# Patient Record
Sex: FEMALE | Race: WHITE | NOT HISPANIC OR LATINO | Employment: UNEMPLOYED | URBAN - METROPOLITAN AREA
[De-identification: names, ages, dates, MRNs, and addresses within clinical notes are randomized per-mention and may not be internally consistent; named-entity substitution may affect disease eponyms.]

---

## 2017-05-17 DIAGNOSIS — Z78.9 USES CONTRACEPTION: ICD-10-CM

## 2017-05-17 RX ORDER — ETONOGESTREL/ETHINYL ESTRADIOL .12-.015MG
RING, VAGINAL VAGINAL
Qty: 1 EACH | Refills: 0 | Status: SHIPPED | OUTPATIENT
Start: 2017-05-17 | End: 2017-09-07

## 2017-05-17 NOTE — TELEPHONE ENCOUNTER
Nuvaring 0.12-0.015 MG/24 HR vaginal ring      Last Written Prescription Date: 2/20/17  Last Fill Quantity: 3,   # refills: 0  Last Office Visit with [facility] primary care provider:  12/7/1663-Rfeiqm-Wx, 5/25/16-Annual  Future Office visit: None  Last appt on 12/7/16 POC: She will have 2 cycles with Nuva Ring and call for IUD placement, Yokasta. Method, risks, benefits discussed. Pt will premedicate with ibuprofen 1 hour before insertion.     Routing refill request to SOO Velazco to review and advise. No appt currently scheduled for either an annual or IUD placement.

## 2017-07-03 ENCOUNTER — OFFICE VISIT (OUTPATIENT)
Dept: OBGYN | Facility: CLINIC | Age: 22
End: 2017-07-03
Payer: COMMERCIAL

## 2017-07-03 VITALS — WEIGHT: 130 LBS | SYSTOLIC BLOOD PRESSURE: 110 MMHG | BODY MASS INDEX: 22.31 KG/M2 | DIASTOLIC BLOOD PRESSURE: 66 MMHG

## 2017-07-03 DIAGNOSIS — Z30.430 ENCOUNTER FOR IUD INSERTION: Primary | ICD-10-CM

## 2017-07-03 LAB — BETA HCG QUAL IFA URINE: NEGATIVE

## 2017-07-03 PROCEDURE — 84703 CHORIONIC GONADOTROPIN ASSAY: CPT | Performed by: OBSTETRICS & GYNECOLOGY

## 2017-07-03 PROCEDURE — 58300 INSERT INTRAUTERINE DEVICE: CPT | Performed by: OBSTETRICS & GYNECOLOGY

## 2017-07-03 NOTE — MR AVS SNAPSHOT
"              After Visit Summary   7/3/2017    Tim Roberto    MRN: [id number]           Patient Information     Date Of Birth          1995        Visit Information        Provider Department      7/3/2017 11:45 AM Gabriel Beck MD [facility]        Today's Diagnoses     Encounter for IUD insertion    -  1       Follow-ups after your visit        Who to contact     If you have questions or need follow up information about today's clinic visit or your schedule please contact [facility] directly at [phone].  Normal or non-critical lab and imaging results will be communicated to you by Moogihart, letter or phone within 4 business days after the clinic has received the results. If you do not hear from us within 7 days, please contact the clinic through Acceptdt or phone. If you have a critical or abnormal lab result, we will notify you by phone as soon as possible.  Submit refill requests through GenNext Media or call your pharmacy and they will forward the refill request to us. Please allow 3 business days for your refill to be completed.          Additional Information About Your Visit        MyChart Information     Gen[employer] Media lets you send messages to your doctor, view your test results, renew your prescriptions, schedule appointments and more. To sign up, go to [email or web address] . Click on \"Log in\" on the left side of the screen, which will take you to the Welcome page. Then click on \"Sign up Now\" on the right side of the page.     You will be asked to enter the access code listed below, as well as some personal information. Please follow the directions to create your username and password.     Your access code is: [email or web address]  Expires: 10/1/2017 11:53 AM     Your access code will  in 90 days. If you need help or a new code, please call your [place] clinic or [phone].        Care EveryWhere ID     This is your Care EveryWhere ID. This could be used " by other organizations to access your [place] medical records  [id number]        Your Vitals Were     Last Period Breastfeeding? BMI (Body Mass Index)             06/30/2017 (Approximate) No 22.31 kg/m2          Blood Pressure from Last 3 Encounters:   07/03/17 110/66   12/07/16 102/76   11/09/16 104/60    Weight from Last 3 Encounters:   07/03/17 130 lb (59 kg)   12/07/16 127 lb (57.6 kg)   11/09/16 128 lb (58.1 kg)              We Performed the Following     Beta HCG qual IFA urine     IUD INSERTION     LEVONORGESTREL-RELEASE INTRAUTERINE CONTRACEPTIVE (AUSTIN), 13.5 MG          Today's Medication Changes          These changes are accurate as of: 7/3/17 12:16 PM.  If you have any questions, ask your nurse or doctor.               Start taking these medicines.        Dose/Directions    Levonorgestrel 13.5 MG Iud   Commonly known as:  AUSTIN   Used for:  Encounter for IUD insertion   Started by:  Gabriel Beck MD        Dose:  1 Intra Uterine Device   1 each (13.5 mg) by Intrauterine route once for 1 dose   Quantity:  1 each   Refills:  0            Where to get your medicines      Some of these will need a paper prescription and others can be bought over the counter.  Ask your nurse if you have questions.     You don't need a prescription for these medications     Levonorgestrel 13.5 MG Iud                Primary Care Provider    None Specified       No primary provider on file.        Equal Access to Services     MIGUEL [facility]ON[facility] : Rahul Larson, allie real, teo shepherd . So [facility] [phone].    ATENCIÓN: Si habla español, tiene a vazquez disposición servicios gratuitos de asistencia lingüística. Llame al [phone].    We comply with applicable federal civil rights laws and [place] laws. We do not discriminate on the basis of race, color, national origin, age, disability sex, sexual orientation or gender identity.            Thank you!      Thank you for choosing [facility] [place]  for your care. Our goal is always to provide you with excellent care. Hearing back from our patients is one way we can continue to improve our services. Please take a few minutes to complete the written survey that you may receive in the mail after your visit with us. Thank you!             Your Updated Medication List - Protect others around you: Learn how to safely use, store and throw away your medicines at www.disposemymeds.org.          This list is accurate as of: 7/3/17 12:16 PM.  Always use your most recent med list.                   Brand Name Dispense Instructions for use Diagnosis    Levonorgestrel 13.5 MG Iud    AUSTIN    1 each    1 each (13.5 mg) by Intrauterine route once for 1 dose    Encounter for IUD insertion       NUVARING 0.12-0.015 MG/24HR vaginal ring   Generic drug:  etonogestrel-ethinyl estradiol     1 each    PLACE 1 RING VAGINALLY EVERY 28 DAYS.    Uses contraception

## 2017-07-03 NOTE — PROGRESS NOTES
INDICATIONS:                                                      Is a pregnancy test required: Yes.  Was it positive or negative?  Negative  Was a consent obtained?  Yes    AUSTIN IUD  NDC: 03871-893-48  LOT: [id number]      Tim Roberto is a 21 year old female,   who presents for insertion of an IUD. The risks, benefits and alternatives of IUD insertion were discussed in detail previously. She also has reviewed the product brochure.  She has elected to go ahead with the insertion  today and her questions were answered. Consent was signed. Her LMP began on  and was normal in duration and amount of flow. A pregnancy test was performed today:  Yes      PROCEDURE:                                                      The pelvic exam revealed normal external genitalia. On bimanual exam the uterus was Anteverted and Midposition and normal in size with no tenderness present. A speculum was inserted into the vagina and the cervix was visualized. The cervix was prepped with Betadine . The anterior lip of the cervix was grasped with a single toothed tenaculum. The uterus sounded to 6 cm. A Austin IUD was then inserted without difficulty. The string was cut to 3 cm.  The patient experienced a moderate amount of cramping.    POST PROCEDURE:                                                      She  tolerated the procedure well. There were no complications. Patient was discharged in stable condition.    Return to clinic in 5 weeks for IUD check.  Call if severe cramping, fever, abnormal bleeding, abnormal discharge or pelvic pain develop.  Patient was counseled about the chance of irregular bleeding.    Gabriel Beck MD

## 2017-09-07 ENCOUNTER — HOSPITAL ENCOUNTER (OUTPATIENT)
Dept: MAMMOGRAPHY | Facility: CLINIC | Age: 22
Discharge: HOME | End: 2017-09-07
Attending: NURSE PRACTITIONER | Admitting: NURSE PRACTITIONER
Payer: COMMERCIAL

## 2017-09-07 ENCOUNTER — OFFICE VISIT (OUTPATIENT)
Dept: OBGYN | Facility: CLINIC | Age: 22
End: 2017-09-07
Payer: COMMERCIAL

## 2017-09-07 VITALS
BODY MASS INDEX: 22.36 KG/M2 | DIASTOLIC BLOOD PRESSURE: 74 MMHG | WEIGHT: 131 LBS | SYSTOLIC BLOOD PRESSURE: 118 MMHG | HEIGHT: 64 IN

## 2017-09-07 DIAGNOSIS — N63.20 LEFT BREAST LUMP: Primary | ICD-10-CM

## 2017-09-07 DIAGNOSIS — N63.20 LEFT BREAST LUMP: ICD-10-CM

## 2017-09-07 PROCEDURE — 76642 ULTRASOUND BREAST LIMITED: CPT | Mod: LT

## 2017-09-07 PROCEDURE — 99213 OFFICE O/P EST LOW 20 MIN: CPT | Performed by: NURSE PRACTITIONER

## 2017-09-07 NOTE — MR AVS SNAPSHOT
"              After Visit Summary   9/7/2017    Tim Roberto    MRN: [id number]           Patient Information     Date Of Birth          1995        Visit Information        Provider Department      9/7/2017 1:30 PM Mora Garrett APRN CNP [facility]        Today's Diagnoses     Left breast lump    -  1       Follow-ups after your visit        Future tests that were ordered for you today     Open Future Orders        Priority Expected Expires Ordered    US Breast Left Complete 4 Quadrants Routine  9/7/2018 9/7/2017            Who to contact     If you have questions or need follow up information about today's clinic visit or your schedule please contact [facility] directly at [phone].  Normal or non-critical lab and imaging results will be communicated to you by Capricor Therapeuticshart, letter or phone within 4 business days after the clinic has received the results. If you do not hear from us within 7 days, please contact the clinic through Capricor Therapeuticshart or phone. If you have a critical or abnormal lab result, we will notify you by phone as soon as possible.  Submit refill requests through Educerus or call your pharmacy and they will forward the refill request to us. Please allow 3 business days for your refill to be completed.          Additional Information About Your Visit        MyChart Information     Educerus lets you send messages to your doctor, view your test results, renew your prescriptions, schedule appointments and more. To sign up, go to [email or web address] . Click on \"Log in\" on the left side of the screen, which will take you to the Welcome page. Then click on \"Sign up Now\" on the right side of the page.     You will be asked to enter the access code listed below, as well as some personal information. Please follow the directions to create your username and password.     Your access code is: [email or web address]  Expires: 10/1/2017 11:53 AM     Your access code will " " in 90 days. If you need help or a new code, please call your [place] clinic or [phone].        Care EveryWhere ID     This is your Care EveryWhere ID. This could be used by other organizations to access your [place] medical records  [id number]        Your Vitals Were     Height BMI (Body Mass Index)                5' 4\" (1.626 m) 22.49 kg/m2           Blood Pressure from Last 3 Encounters:   17 118/74   17 110/66   16 102/76    Weight from Last 3 Encounters:   17 131 lb (59.4 kg)   17 130 lb (59 kg)   16 127 lb (57.6 kg)               Primary Care Provider    None Specified       No primary provider on file.        Equal Access to Services     EMMA QUIGLEY : Rahul Larson, wagisel luqadaha, qaybta kaalmajarod hirsch, teo parks . So [facility] [phone].    ATENCIÓN: Si habla español, tiene a vazquez disposición servicios gratuitos de asistencia lingüística. Llame al [phone].    We comply with applicable federal civil rights laws and [place] laws. We do not discriminate on the basis of race, color, national origin, age, disability sex, sexual orientation or gender identity.            Thank you!     Thank you for choosing [facility]  for your care. Our goal is always to provide you with excellent care. Hearing back from our patients is one way we can continue to improve our services. Please take a few minutes to complete the written survey that you may receive in the mail after your visit with us. Thank you!             Your Updated Medication List - Protect others around you: Learn how to safely use, store and throw away your medicines at www.disposemymeds.org.          This list is accurate as of: 17  2:29 PM.  Always use your most recent med list.                   Brand Name Dispense Instructions for use Diagnosis    Levonorgestrel 13.5 MG Iud    AUSTIN    1 each    1 each (13.5 mg) by " Intrauterine route once for 1 dose    Encounter for IUD insertion

## 2017-09-07 NOTE — PROGRESS NOTES
SUBJECTIVE:                                                   Tim Roberto is a 21 year old female who presents to clinic today for the following health issue(s):  Patient presents with:  Breast Mass: Noticed lump 3 wks ago, got more tender. States has gotten better but still there      HPI:  Pt here today with c/o of a left breast lump right behind her nipple. She also notices that her nipple does not get erect as the right one does.     She has had in the last 6 months, a pregnancy, elective AB and an IUD placed.     No LMP recorded. Patient is not currently having periods (Reason: IUD)..   Patient is sexually active, .  Using IUD for contraception.    reports that she has been smoking Other.  She has never used smokeless tobacco.  Tobacco Cessation Action Plan: Information offered: Patient not interested at this time  STD testing offered?  Declined    Health maintenance updated:  yes    Today's PHQ-2 Score:   PHQ-2 (  [employer]) 2016   Q1: Little interest or pleasure in doing things 1   Q2: Feeling down, depressed or hopeless 1   PHQ-2 Score 2     Today's PHQ-9 Score:   PHQ-9 SCORE 2016   Total Score 7     Today's TOBIAS-7 Score:   TOBIAS-7 SCORE 2016   Total Score 6       Problem list and histories reviewed & adjusted, as indicated.  Additional history: as documented.    Patient Active Problem List   Diagnosis     Suicidal ideation     Episodic mood disorder (H)     Cannabis abuse     Counseling for parent-child problem     Past Surgical History:   Procedure Laterality Date     PROBE LACRIMAL DUCT      as a baby      Social History   Substance Use Topics     Smoking status: Current Every Day Smoker     Types: Other     Smokeless tobacco: Never Used      Comment: 3 cigs/day      Alcohol use No      Problem (# of Occurrences) Relation (Name,Age of Onset)    Hyperlipidemia (1) Father       Negative family history of: DIABETES, Breast Cancer            Current Outpatient Prescriptions  "  Medication Sig     Levonorgestrel (AUSTIN) 13.5 MG IUD 1 each (13.5 mg) by Intrauterine route once for 1 dose     No current facility-administered medications for this visit.      No Known Allergies    ROS:  12 point review of systems negative other than symptoms noted below.  Breast: Lumps and Tenderness    OBJECTIVE:     /74  Ht 5' 4\" (1.626 m)  Wt 131 lb (59.4 kg)  BMI 22.49 kg/m2  Body mass index is 22.49 kg/(m^2).    Exam:  Constitutional:  Appearance: Well nourished, well developed alert, in no acute distress  Breasts:  Inspection of Breasts:  No lymphadenopathy present; Palpation of Breasts and Axillae:  SMALL PEANUT SIZE LUMP RIGHT BEHIND LEFT NIPPLE, MOBILE, no breast tenderness Axillary Lymph Nodes:  No lymphadenopathy present  Neurologic/Psychiatric:  Mental Status:  Oriented X3      In-Clinic Test Results:  No results found for this or any previous visit (from the past 24 hour(s)).    ASSESSMENT/PLAN:                                                        ICD-10-CM    1. Left breast lump N63 US Breast Left Complete 4 Quadrants       There are no Patient Instructions on file for this visit.    Likely a hormonal cyst given her gynecological history in the last 6-9 months. Will r/o with ultrasound.    TRAN Reyes CNP  [facility] [place]  "

## 2018-06-20 ENCOUNTER — OFFICE VISIT (OUTPATIENT)
Dept: OBGYN | Facility: CLINIC | Age: 23
End: 2018-06-20
Payer: COMMERCIAL

## 2018-06-20 VITALS
SYSTOLIC BLOOD PRESSURE: 94 MMHG | WEIGHT: 139.25 LBS | HEIGHT: 64 IN | HEART RATE: 80 BPM | BODY MASS INDEX: 23.77 KG/M2 | DIASTOLIC BLOOD PRESSURE: 64 MMHG

## 2018-06-20 DIAGNOSIS — N94.6 DYSMENORRHEA: Primary | ICD-10-CM

## 2018-06-20 DIAGNOSIS — Z11.8 SPECIAL SCREENING EXAMINATION FOR CHLAMYDIAL DISEASE: ICD-10-CM

## 2018-06-20 DIAGNOSIS — Z11.3 SCREEN FOR SEXUALLY TRANSMITTED DISEASES: ICD-10-CM

## 2018-06-20 DIAGNOSIS — Z30.432 ENCOUNTER FOR IUD REMOVAL: ICD-10-CM

## 2018-06-20 PROCEDURE — 58301 REMOVE INTRAUTERINE DEVICE: CPT | Performed by: NURSE PRACTITIONER

## 2018-06-20 PROCEDURE — 99213 OFFICE O/P EST LOW 20 MIN: CPT | Mod: 25 | Performed by: NURSE PRACTITIONER

## 2018-06-20 PROCEDURE — 87491 CHLMYD TRACH DNA AMP PROBE: CPT | Performed by: NURSE PRACTITIONER

## 2018-06-20 PROCEDURE — 87591 N.GONORRHOEAE DNA AMP PROB: CPT | Performed by: NURSE PRACTITIONER

## 2018-06-20 RX ORDER — LANOLIN ALCOHOL/MO/W.PET/CERES
3 CREAM (GRAM) TOPICAL
COMMUNITY

## 2018-06-20 RX ORDER — NORELGESTROMIN AND ETHINYL ESTRADIOL 35; 150 UG/MG; UG/MG
PATCH TRANSDERMAL
Qty: 9 PATCH | Refills: 3 | Status: SHIPPED | OUTPATIENT
Start: 2018-06-20 | End: 2019-11-14

## 2018-06-20 NOTE — MR AVS SNAPSHOT
"              After Visit Summary   2018    Tim Roberto    MRN: [id number]           Patient Information     Date Of Birth          1995        Visit Information        Provider Department      2018 2:00 PM Mora Garrett APRN CNP [facility]        Today's Diagnoses     Dysmenorrhea    -  1    Encounter for IUD removal        Screen for sexually transmitted diseases        Special screening examination for chlamydial disease           Follow-ups after your visit        Who to contact     If you have questions or need follow up information about today's clinic visit or your schedule please contact [facility] directly at [phone].  Normal or non-critical lab and imaging results will be communicated to you by MyChart, letter or phone within 4 business days after the clinic has received the results. If you do not hear from us within 7 days, please contact the clinic through [employer] or phone. If you have a critical or abnormal lab result, we will notify you by phone as soon as possible.  Submit refill requests through MailMag or call your pharmacy and they will forward the refill request to us. Please allow 3 business days for your refill to be completed.          Additional Information About Your Visit        MyChart Information     MailMag lets you send messages to your doctor, view your test results, renew your prescriptions, schedule appointments and more. To sign up, go to [email or web address] . Click on \"Log in\" on the left side of the screen, which will take you to the Welcome page. Then click on \"Sign up Now\" on the right side of the page.     You will be asked to enter the access code listed below, as well as some personal information. Please follow the directions to create your username and password.     Your access code is: [email or web address]  Expires: 2018  1:59 PM     Your access code will  in 90 days. If you need help or a new " "code, please call your [place] clinic or [phone].        Care EveryWhere ID     This is your Care EveryWhere ID. This could be used by other organizations to access your [place] medical records  [id number]        Your Vitals Were     Pulse Height Last Period Breastfeeding? BMI (Body Mass Index)       80 5' 4.25\" (1.632 m) 06/07/2018 (Approximate) No 23.72 kg/m2        Blood Pressure from Last 3 Encounters:   06/20/18 94/64   09/07/17 118/74   07/03/17 110/66    Weight from Last 3 Encounters:   06/20/18 139 lb 4 oz (63.2 kg)   09/07/17 131 lb (59.4 kg)   07/03/17 130 lb (59 kg)              We Performed the Following     CHLAMYDIA TRACHOMATIS PCR     NEISSERIA GONORRHOEA PCR          Today's Medication Changes          These changes are accurate as of 6/20/18  2:47 PM.  If you have any questions, ask your nurse or doctor.               Start taking these medicines.        Dose/Directions    norelgestromin-ethinyl estradiol 150-35 MCG/24HR patch   Commonly known as:  ORTHO EVRA   Used for:  Dysmenorrhea   Started by:  Mora Garrett APRN CNP        Remove old patch and apply new patch onto the skin once a week for 3 weeks (21 days). Do not wear patch week 4 (days 22-28), then repeat.   Quantity:  9 patch   Refills:  3         Stop taking these medicines if you haven't already. Please contact your care team if you have questions.     Levonorgestrel 13.5 MG Iud   Commonly known as:  AUSTIN   Stopped by:  Mora Garrett APRN CNP                Where to get your medicines      These medications were sent to [id number] - [place]EVENS - [address]  [address] [facility] [place] [address] [place] [address] [facility] [place] [address]     Phone:  [phone]     norelgestromin-ethinyl estradiol 150-35 MCG/24HR patch                Primary Care Provider Office Phone # Fax #    [facility] [phone]       [place] [address]  [address] [facility] [address]  [facility] [address]        Equal Access to Services     EMMA QUIGLEY : Hadii aad ku hadumupaola Sojame, watraceyda lumarianosamanthaha, qakalpeshta kanewjarod noelmylenejarod, teo javierhemantmaxim briceno. So [facility] [phone].    ATENCIÓN: Si habla español, tiene a vazquez disposición servicios gratuitos de asistencia lingüística. Llame al [phone].    We comply with applicable federal civil rights laws and [place] laws. We do not discriminate on the basis of race, color, national origin, age, disability, sex, sexual orientation, or gender identity.            Thank you!     Thank you for choosing [facility]  for your care. Our goal is always to provide you with excellent care. Hearing back from our patients is one way we can continue to improve our services. Please take a few minutes to complete the written survey that you may receive in the mail after your visit with us. Thank you!             Your Updated Medication List - Protect others around you: Learn how to safely use, store and throw away your medicines at www.disposemymeds.org.          This list is accurate as of 6/20/18  2:47 PM.  Always use your most recent med list.                   Brand Name Dispense Instructions for use Diagnosis    melatonin 3 MG tablet      Take 3 mg by mouth        norelgestromin-ethinyl estradiol 150-35 MCG/24HR patch    ORTHO EVRA    9 patch    Remove old patch and apply new patch onto the skin once a week for 3 weeks (21 days). Do not wear patch week 4 (days 22-28), then repeat.    Dysmenorrhea

## 2018-06-20 NOTE — PROGRESS NOTES
SUBJECTIVE:                                                   Tim Roberto is a 22 year old female who presents to clinic today for the following health issue(s):  Patient presents with:  IUD: a week of bleeding every month with cramping. 3-4 times each month of severe pain.     HPI:  Pt here today with c/o pelvic cramping during her 10 day menses to the point of crying and shaking in pain. She had a yonny IUD placed in  without difficulty. She was amenorrheic for about 9 months, then in October she started having heavy menses with worsening cramps.     She is also accepting STD testing today.     She states she would not be a good pill taker. She used the Nuva Ring in the past and liked that option. She's not sure she want's to go back to that method though.    Patient's last menstrual period was 2018 (approximate)..   Patient is sexually active, .  Using IUD for contraception.    reports that she has been smoking Other.  She has never used smokeless tobacco.  Tobacco Cessation Action Plan: Information offered: Patient not interested at this time  STD testing offered?  accepted    Health maintenance updated:  yes    Today's PHQ-2 Score:   PHQ-2 (  [employer]) 2018   Q1: Little interest or pleasure in doing things 0   Q2: Feeling down, depressed or hopeless 0   PHQ-2 Score 0     Today's PHQ-9 Score:   PHQ-9 SCORE 2016   Total Score 7     Today's TOBIAS-7 Score:   TOBIAS-7 SCORE 2016   Total Score 6       Problem list and histories reviewed & adjusted, as indicated.  Additional history: as documented.    Patient Active Problem List   Diagnosis     Suicidal ideation     Episodic mood disorder (H)     Cannabis abuse     Counseling for parent-child problem     Past Surgical History:   Procedure Laterality Date     PROBE LACRIMAL DUCT      as a baby      Social History   Substance Use Topics     Smoking status: Current Every Day Smoker     Types: Other     Smokeless tobacco: Never Used      " Comment: 3 cigs/day      Alcohol use No      Problem (# of Occurrences) Relation (Name,Age of Onset)    Hyperlipidemia (1) Father       Negative family history of: Diabetes, Breast Cancer            Current Outpatient Prescriptions   Medication Sig     melatonin 3 MG tablet Take 3 mg by mouth     norelgestromin-ethinyl estradiol (ORTHO EVRA) 150-35 MCG/24HR patch Remove old patch and apply new patch onto the skin once a week for 3 weeks (21 days). Do not wear patch week 4 (days 22-28), then repeat.     No current facility-administered medications for this visit.      No Known Allergies    ROS:  12 point review of systems negative other than symptoms noted below.  Constitutional: Fatigue  Gastrointestinal: Abdominal Pain  Genitourinary: Cramps, Heavy Bleeding with Period and Significant PMS    OBJECTIVE:     BP 94/64  Pulse 80  Ht 5' 4.25\" (1.632 m)  Wt 139 lb 4 oz (63.2 kg)  LMP 06/07/2018 (Approximate)  Breastfeeding? No  BMI 23.72 kg/m2  Body mass index is 23.72 kg/(m^2).    Exam:  Constitutional:  Appearance: Well nourished, well developed alert, in no acute distress  Neurologic/Psychiatric:  Mental Status:  Oriented X3   Pelvic Exam:  External Genitalia:     Normal appearance for age, no discharge present, no tenderness present, no inflammatory lesions present, color normal  Vagina:    Normal vaginal vault without central or paravaginal defects, COPIOUS WATERY YELLOW discharge present, no inflammatory lesions present, no masses present  Bladder:     Nontender to palpation  Urethra:   Urethral Body:  Urethra palpation normal, urethra structural support normal   Urethral Meatus:  No erythema or lesions present  Cervix:     Appearance healthy, no lesions present, nontender to palpation, no bleeding present, string present  Uterus:     Nontender to palpation, no masses present, position anteflexed, mobility: normal  Adnexa:     No adnexal tenderness present, no adnexal masses present  Perineum:     Perineum " within normal limits, no evidence of trauma, no rashes or skin lesions present  Anus:     Anus within normal limits, no hemorrhoids present  Inguinal Lymph Nodes:     No lymphadenopathy present  Pubic Hair:     Normal pubic hair distribution for age  Genitalia and Groin:     No rashes present, no lesions present, no areas of discoloration, no masses present       In-Clinic Test Results:  No results found for this or any previous visit (from the past 24 hour(s)).    ASSESSMENT/PLAN:                                                        ICD-10-CM    1. Dysmenorrhea N94.6 norelgestromin-ethinyl estradiol (ORTHO EVRA) 150-35 MCG/24HR patch   2. Encounter for IUD removal Z30.432    3. Screen for sexually transmitted diseases Z11.3 NEISSERIA GONORRHOEA PCR   4. Special screening examination for chlamydial disease Z11.8 CHLAMYDIA TRACHOMATIS PCR       There are no Patient Instructions on file for this visit.    IUD removed today. Will start contraceptive patches. She will call if she wishes to use them continuously to suppress menses due to pain. Will update on STD testing    TRAN Reyes [facility]    INDICATIONS:                                                      Is a pregnancy test required: No.  Was a consent obtained?  Yes    Tim Roberto is a 22 year old female,, Patient's last menstrual period was 2018 (approximate). who presents today for IUD removal. Her current IUD was placed 1.5years ago. She has had problems including heavy bleeding and cramping with the IUD. She requests removal of the IUD because she wants to change her method of contraception    Today's PHQ-2 Score:   PHQ-2 (  [employer]) 2018   Q1: Little interest or pleasure in doing things 0   Q2: Feeling down, depressed or hopeless 0   PHQ-2 Score 0       PROCEDURE:                                                      A speculum exam was performed and the cervix was visualized. The IUD string  was visualized. Using ring forceps, the string  was grasped and the IUD removed intact.    POST PROCEDURE:                                                      The patient tolerated the procedure well. Patient was discharged in stable condition.    Call if bleeding, pain or fever occur., Birth control counseling given. and Ortho Evra Patches sent to RX    TRAN Reyes CNP

## 2018-06-21 LAB
C TRACH DNA SPEC QL NAA+PROBE: NEGATIVE
N GONORRHOEA DNA SPEC QL NAA+PROBE: NEGATIVE
SPECIMEN SOURCE: NORMAL
SPECIMEN SOURCE: NORMAL

## 2019-01-29 ENCOUNTER — OFFICE VISIT (OUTPATIENT)
Dept: OBGYN | Facility: CLINIC | Age: 24
End: 2019-01-29
Payer: COMMERCIAL

## 2019-01-29 VITALS
DIASTOLIC BLOOD PRESSURE: 68 MMHG | HEIGHT: 64 IN | WEIGHT: 146 LBS | SYSTOLIC BLOOD PRESSURE: 106 MMHG | BODY MASS INDEX: 24.92 KG/M2 | HEART RATE: 70 BPM

## 2019-01-29 DIAGNOSIS — Z11.8 SCREENING FOR CHLAMYDIAL DISEASE: ICD-10-CM

## 2019-01-29 DIAGNOSIS — Z11.3 SCREEN FOR STD (SEXUALLY TRANSMITTED DISEASE): ICD-10-CM

## 2019-01-29 DIAGNOSIS — Z01.419 ENCOUNTER FOR GYNECOLOGICAL EXAMINATION WITHOUT ABNORMAL FINDING: Primary | ICD-10-CM

## 2019-01-29 DIAGNOSIS — N94.6 DYSMENORRHEA: ICD-10-CM

## 2019-01-29 PROCEDURE — 88175 CYTOPATH C/V AUTO FLUID REDO: CPT | Performed by: NURSE PRACTITIONER

## 2019-01-29 PROCEDURE — 87491 CHLMYD TRACH DNA AMP PROBE: CPT | Performed by: NURSE PRACTITIONER

## 2019-01-29 PROCEDURE — 87591 N.GONORRHOEAE DNA AMP PROB: CPT | Performed by: NURSE PRACTITIONER

## 2019-01-29 PROCEDURE — 99395 PREV VISIT EST AGE 18-39: CPT | Performed by: NURSE PRACTITIONER

## 2019-01-29 RX ORDER — ETONOGESTREL AND ETHINYL ESTRADIOL VAGINAL RING .015; .12 MG/D; MG/D
RING VAGINAL
Qty: 3 EACH | Refills: 3 | Status: SHIPPED | OUTPATIENT
Start: 2019-01-29 | End: 2019-11-14

## 2019-01-29 RX ORDER — NORELGESTROMIN AND ETHINYL ESTRADIOL 35; 150 UG/MG; UG/MG
PATCH TRANSDERMAL
Qty: 9 PATCH | Refills: 3 | Status: CANCELLED | OUTPATIENT
Start: 2019-01-29

## 2019-01-29 SDOH — HEALTH STABILITY: MENTAL HEALTH: HOW OFTEN DO YOU HAVE A DRINK CONTAINING ALCOHOL?: 2-3 TIMES A WEEK

## 2019-01-29 SDOH — HEALTH STABILITY: MENTAL HEALTH: HOW OFTEN DO YOU HAVE 6 OR MORE DRINKS ON ONE OCCASION?: MONTHLY

## 2019-01-29 SDOH — HEALTH STABILITY: MENTAL HEALTH: HOW MANY STANDARD DRINKS CONTAINING ALCOHOL DO YOU HAVE ON A TYPICAL DAY?: 1 OR 2

## 2019-01-29 ASSESSMENT — ANXIETY QUESTIONNAIRES
5. BEING SO RESTLESS THAT IT IS HARD TO SIT STILL: NOT AT ALL
3. WORRYING TOO MUCH ABOUT DIFFERENT THINGS: SEVERAL DAYS
GAD7 TOTAL SCORE: 9
1. FEELING NERVOUS, ANXIOUS, OR ON EDGE: MORE THAN HALF THE DAYS
IF YOU CHECKED OFF ANY PROBLEMS ON THIS QUESTIONNAIRE, HOW DIFFICULT HAVE THESE PROBLEMS MADE IT FOR YOU TO DO YOUR WORK, TAKE CARE OF THINGS AT HOME, OR GET ALONG WITH OTHER PEOPLE: SOMEWHAT DIFFICULT
2. NOT BEING ABLE TO STOP OR CONTROL WORRYING: MORE THAN HALF THE DAYS
7. FEELING AFRAID AS IF SOMETHING AWFUL MIGHT HAPPEN: SEVERAL DAYS
6. BECOMING EASILY ANNOYED OR IRRITABLE: MORE THAN HALF THE DAYS

## 2019-01-29 ASSESSMENT — PATIENT HEALTH QUESTIONNAIRE - PHQ9
5. POOR APPETITE OR OVEREATING: SEVERAL DAYS
SUM OF ALL RESPONSES TO PHQ QUESTIONS 1-9: 7

## 2019-01-29 ASSESSMENT — MIFFLIN-ST. JEOR: SCORE: 1402.25

## 2019-01-29 NOTE — LETTER
February 3, 2019      Tim Roberto  [address] [address]    Dear ,      I am happy to inform you that your recent cervical cancer screening test (PAP smear) was normal.      Preventative screenings such as this help to ensure your health for years to come. You should repeat a pap smear in 3 years, unless otherwise directed.      You will still need to return to the clinic every year for your annual exam and other preventive tests.     If you have additional questions regarding this result, please call our registered nurse, Yue at [phone].      Sincerely,      Mora Garrett, APRN CNP/rlm

## 2019-01-29 NOTE — PROGRESS NOTES
Tim is a 23 year old  female who presents for annual exam.     Besides routine health maintenance, she has no other health concerns today .    HPI:  The patient's PCP is [facility].  Pt here today for her annual exam. She is feeling well but does want to stop the patches and go back to nuva ring. She is having skin irritation with the patch.    She is smoking, info given to quit.     + s.a. No pain or bleeding.     Last pap in . Will pap today      GYNECOLOGIC HISTORY:    Patient's last menstrual period was 2019 (lmp unknown).  Her current contraception method is: Patch.  She  reports that she has been smoking other and cigarettes.  she has never used smokeless tobacco.    Patient is sexually active.  STD testing offered?  Accepted  Last PHQ-9 score on record =   PHQ-9 SCORE 2019   PHQ-9 Total Score 7     Last GAD7 score on record =   TOBIAS-7 SCORE 2019   Total Score 9     Alcohol Score = 3    HEALTH MAINTENANCE:  Cholesterol: Non   Last Mammo: never, Result: not applicable, Next Mammo: due age 40.   Pap:   Lab Results   Component Value Date    PAP ASC-US 06/10/2016    PAP LSIL 2016      Colonoscopy:  never, Result: not applicable, Next Colonoscopy: due age 40.   Dexa:  NA    Health maintenance updated:  yes    HISTORY:  Obstetric History       T0      L0     SAB0   TAB1   Ectopic0   Multiple0   Live Births0       # Outcome Date GA Lbr Andres/2nd Weight Sex Delivery Anes PTL Lv   1 TAB 2016 7w0d    TAB             Patient Active Problem List   Diagnosis     Suicidal ideation     Episodic mood disorder (H)     Cannabis abuse     Counseling for parent-child problem     Past Surgical History:   Procedure Laterality Date     PROBE LACRIMAL DUCT      as a baby      Social History     Tobacco Use     Smoking status: Current Every Day Smoker     Types: Other, Cigarettes     Smokeless tobacco: Never Used     Tobacco comment: 3 cigs/day   "  Substance Use Topics     Alcohol use: Yes     Frequency: 2-3 times a week     Drinks per session: 1 or 2     Binge frequency: Monthly      Problem (# of Occurrences) Relation (Name,Age of Onset)    Hyperlipidemia (1) Father       Negative family history of: Diabetes, Breast Cancer            Current Outpatient Medications   Medication Sig     etonogestrel-ethinyl estradiol (NUVARING) 0.12-0.015 MG/24HR vaginal ring Place 1 ring every 21 days then remove for 1 week.     melatonin 3 MG tablet Take 3 mg by mouth     norelgestromin-ethinyl estradiol (ORTHO EVRA) 150-35 MCG/24HR patch Remove old patch and apply new patch onto the skin once a week for 3 weeks (21 days). Do not wear patch week 4 (days 22-28), then repeat.     No current facility-administered medications for this visit.      No Known Allergies    Past medical, surgical, social and family histories were reviewed and updated in EPIC.    ROS:   12 point review of systems negative other than symptoms noted below.  Gastrointestinal: Heartburn  Psychiatric: Difficulty Sleeping    EXAM:  /68   Pulse 70   Ht 1.626 m (5' 4\")   Wt 66.2 kg (146 lb)   LMP 01/16/2019 (LMP Unknown)   BMI 25.06 kg/m     BMI: Body mass index is 25.06 kg/m .    PHYSICAL EXAM:  Constitutional:  Appearance: Well nourished, well developed, alert, in no acute distress  Neck:  Lymph Nodes:  No lymphadenopathy present    Thyroid:  Gland size normal, nontender, no nodules or masses present  on palpation  Chest:  Respiratory Effort:  Breathing unlabored  Cardiovascular:    Heart: Auscultation:  Regular rate, normal rhythm, no murmurs present  Breasts: Inspection of Breasts:  No lymphadenopathy present., Palpation of Breasts and Axillae:  No masses present on palpation, no breast tenderness., Axillary Lymph Nodes:  No lymphadenopathy present. and No nodularity, asymmetry or nipple discharge bilaterally.  Gastrointestinal:   Abdominal Examination:  Abdomen nontender to palpation, tone " normal without rigidity or guarding, no masses present, umbilicus without lesions   Liver and Spleen:  No hepatomegaly present, liver nontender to palpation    Hernias:  No hernias present  Lymphatic: Lymph Nodes:  No other lymphadenopathy present  Skin:  General Inspection:  No rashes present, no lesions present, no areas of  discoloration    Genitalia and Groin:  No rashes present, no lesions present, no areas of  discoloration, no masses present  Neurologic/Psychiatric:    Mental Status:  Oriented X3     Pelvic Exam:  External Genitalia:     Normal appearance for age, no discharge present, no tenderness present, no inflammatory lesions present, color normal  Vagina:     Normal vaginal vault without central or paravaginal defects, no discharge present, no inflammatory lesions present, no masses present  Bladder:     Nontender to palpation  Urethra:   Urethral Body:  Urethra palpation normal, urethra structural support normal   Urethral Meatus:  No erythema or lesions present  Cervix:     Appearance healthy, no lesions present, nontender to palpation, no bleeding present  Uterus:     Uterus: firm, normal sized and nontender, midplane in position.   Adnexa:     No adnexal tenderness present, no adnexal masses present  Perineum:     Perineum within normal limits, no evidence of trauma, no rashes or skin lesions present  Anus:     Anus within normal limits, no hemorrhoids present  Inguinal Lymph Nodes:     No lymphadenopathy present  Pubic Hair:     Normal pubic hair distribution for age  Genitalia and Groin:     No rashes present, no lesions present, no areas of discoloration, no masses present      COUNSELING:   Special attention given to:        Regular exercise       Healthy diet/nutrition       Contraception       Safe sex practices/STD prevention    BMI: Body mass index is 25.06 kg/m .  Weight management plan: Discussed healthy diet and exercise guidelines    ASSESSMENT:  23 year old female with satisfactory  annual exam.    ICD-10-CM    1. Encounter for gynecological examination without abnormal finding Z01.419 Pap imaged thin layer screen only - recommended age 21 - 24 years   2. Dysmenorrhea N94.6 etonogestrel-ethinyl estradiol (NUVARING) 0.12-0.015 MG/24HR vaginal ring   3. Screening for chlamydial disease Z11.8 CHLAMYDIA TRACHOMATIS PCR   4. Screen for STD (sexually transmitted disease) Z11.3 NEISSERIA GONORRHOEA PCR       PLAN:  Normal gyn exam. STD testing updated. Pap updated. Pap every 3 years. Will start nuva ring. Pt familiar with method.     TRAN Reyes CNP

## 2019-01-30 ASSESSMENT — ANXIETY QUESTIONNAIRES: GAD7 TOTAL SCORE: 9

## 2019-02-01 LAB
COPATH REPORT: NORMAL
PAP: NORMAL

## 2019-03-24 ENCOUNTER — TRANSFERRED RECORDS (OUTPATIENT)
Dept: HEALTH INFORMATION MANAGEMENT | Facility: CLINIC | Age: 24
End: 2019-03-24

## 2019-03-24 ENCOUNTER — TELEPHONE (OUTPATIENT)
Dept: NURSING | Facility: CLINIC | Age: 24
End: 2019-03-24

## 2019-03-24 NOTE — TELEPHONE ENCOUNTER
"Patient said she is \"getting a cold sore\" and wants a refill of a medication that is not prescribed by a [facility] MD. Unable to request a refill.  Freida Yip RN  [facility]    "

## 2019-11-14 ENCOUNTER — OFFICE VISIT (OUTPATIENT)
Dept: OBGYN | Facility: CLINIC | Age: 24
End: 2019-11-14
Payer: COMMERCIAL

## 2019-11-14 VITALS
HEIGHT: 65 IN | HEART RATE: 64 BPM | DIASTOLIC BLOOD PRESSURE: 68 MMHG | BODY MASS INDEX: 25.66 KG/M2 | WEIGHT: 154 LBS | SYSTOLIC BLOOD PRESSURE: 112 MMHG

## 2019-11-14 DIAGNOSIS — Z11.8 SCREENING FOR CHLAMYDIAL DISEASE: ICD-10-CM

## 2019-11-14 DIAGNOSIS — Z01.419 ENCOUNTER FOR GYNECOLOGICAL EXAMINATION WITHOUT ABNORMAL FINDING: Primary | ICD-10-CM

## 2019-11-14 DIAGNOSIS — N94.6 DYSMENORRHEA: ICD-10-CM

## 2019-11-14 DIAGNOSIS — Z11.3 SCREEN FOR STD (SEXUALLY TRANSMITTED DISEASE): ICD-10-CM

## 2019-11-14 PROCEDURE — 87491 CHLMYD TRACH DNA AMP PROBE: CPT | Performed by: NURSE PRACTITIONER

## 2019-11-14 PROCEDURE — 87591 N.GONORRHOEAE DNA AMP PROB: CPT | Performed by: NURSE PRACTITIONER

## 2019-11-14 PROCEDURE — 99395 PREV VISIT EST AGE 18-39: CPT | Performed by: NURSE PRACTITIONER

## 2019-11-14 RX ORDER — ETONOGESTREL AND ETHINYL ESTRADIOL VAGINAL RING .015; .12 MG/D; MG/D
RING VAGINAL
Qty: 3 EACH | Refills: 3 | Status: SHIPPED | OUTPATIENT
Start: 2019-11-14 | End: 2020-06-09

## 2019-11-14 ASSESSMENT — ANXIETY QUESTIONNAIRES
5. BEING SO RESTLESS THAT IT IS HARD TO SIT STILL: SEVERAL DAYS
6. BECOMING EASILY ANNOYED OR IRRITABLE: SEVERAL DAYS
1. FEELING NERVOUS, ANXIOUS, OR ON EDGE: SEVERAL DAYS
3. WORRYING TOO MUCH ABOUT DIFFERENT THINGS: SEVERAL DAYS
IF YOU CHECKED OFF ANY PROBLEMS ON THIS QUESTIONNAIRE, HOW DIFFICULT HAVE THESE PROBLEMS MADE IT FOR YOU TO DO YOUR WORK, TAKE CARE OF THINGS AT HOME, OR GET ALONG WITH OTHER PEOPLE: NOT DIFFICULT AT ALL
7. FEELING AFRAID AS IF SOMETHING AWFUL MIGHT HAPPEN: SEVERAL DAYS
2. NOT BEING ABLE TO STOP OR CONTROL WORRYING: SEVERAL DAYS
GAD7 TOTAL SCORE: 7

## 2019-11-14 ASSESSMENT — MIFFLIN-ST. JEOR: SCORE: 1453.38

## 2019-11-14 ASSESSMENT — PATIENT HEALTH QUESTIONNAIRE - PHQ9
SUM OF ALL RESPONSES TO PHQ QUESTIONS 1-9: 6
5. POOR APPETITE OR OVEREATING: SEVERAL DAYS

## 2019-11-14 NOTE — PROGRESS NOTES
Tim is a 24 year old  female who presents for annual exam.     Besides routine health maintenance,  she would like to discuss endometriosis and pain.    HPI:  The patient's PCP is  [facility].  Pt here today for her annual gyn exam. She has had irregular lower right pelvic pain. She denies any pain with IC, bladder or bowel function changes or pain. She is using NR for contracption.    Accepts STD testing today.     She is still smoking tobacco and THC.      GYNECOLOGIC HISTORY:    Patient's last menstrual period was 10/29/2019 (approximate).    Regular menses? yes  Menses every 28 days.  Length of menses: 6 days    Her current contraception method is: NuvaRing.  She  reports that she has been smoking other and cigarettes. She has never used smokeless tobacco.  Tobacco Cessation Action Plan: Information offered: Patient not interested at this time  Self help information given to patient  Patient is sexually active.  STD testing offered?  Accepted  Last PHQ-9 score on record =   PHQ-9 SCORE 2019   PHQ-9 Total Score 6     Last GAD7 score on record =   TOBIAS-7 SCORE 2019   Total Score 7     Alcohol Score = 6    HEALTH MAINTENANCE:  Cholesterol: never  Last Mammo: Not applicable, Result: Not applicable, Next Mammo: Due at age 40   Pap:   Lab Results   Component Value Date    PAP NIL 2019    PAP ASC-US 06/10/2016    PAP LSIL 2016      Colonoscopy:  never, Result: Not applicable, Next Colonoscopy: age 50.  Dexa:  never    Health maintenance updated:  yes    HISTORY:  OB History    Para Term  AB Living   1 0 0 0 1 0   SAB TAB Ectopic Multiple Live Births   0 1 0 0 0      # Outcome Date GA Lbr Andres/2nd Weight Sex Delivery Anes PTL Lv   1 TAB 2016 7w0d    TAB          Patient Active Problem List   Diagnosis     Suicidal ideation     Episodic mood disorder (H)     Cannabis abuse     Counseling for parent-child problem     Past Surgical History:  "  Procedure Laterality Date     PROBE LACRIMAL DUCT      as a baby      Social History     Tobacco Use     Smoking status: Current Every Day Smoker     Types: Other, Cigarettes     Smokeless tobacco: Never Used     Tobacco comment: 3 cigs/day    Substance Use Topics     Alcohol use: Yes     Frequency: 2-3 times a week     Drinks per session: 1 or 2     Binge frequency: Monthly      Problem (# of Occurrences) Relation (Name,Age of Onset)    Hyperlipidemia (1) Father       Negative family history of: Diabetes, Breast Cancer            Current Outpatient Medications   Medication Sig     etonogestrel-ethinyl estradiol (NUVARING) 0.12-0.015 MG/24HR vaginal ring Place 1 ring every 21 days then remove for 1 week.     melatonin 3 MG tablet Take 3 mg by mouth     No current facility-administered medications for this visit.      No Known Allergies    Past medical, surgical, social and family histories were reviewed and updated in EPIC.    ROS:   12 point review of systems negative other than symptoms noted below.  Genitourinary: Pelvic Pain    EXAM:  /68   Pulse 64   Ht 1.657 m (5' 5.25\")   Wt 69.9 kg (154 lb)   LMP 10/29/2019 (Approximate)   BMI 25.43 kg/m     BMI: Body mass index is 25.43 kg/m .    PHYSICAL EXAM:  Constitutional:   Appearance: Well nourished, well developed, alert, in no acute distress  Neck:  Lymph Nodes:  No lymphadenopathy present    Thyroid:  Gland size normal, nontender, no nodules or masses present  on palpation  Chest:  Respiratory Effort:  Breathing unlabored  Cardiovascular:    Heart: Auscultation:  Regular rate, normal rhythm, no murmurs present  Breasts: Inspection of Breasts:  No lymphadenopathy present., Palpation of Breasts and Axillae:  No masses present on palpation, no breast tenderness., Axillary Lymph Nodes:  No lymphadenopathy present., No nodularity, asymmetry or nipple discharge bilaterally. and slight nipple retraction. not new for her.   Gastrointestinal:   Abdominal " Examination:  Abdomen nontender to palpation, tone normal without rigidity or guarding, no masses present, umbilicus without lesions   Liver and Spleen:  No hepatomegaly present, liver nontender to palpation    Hernias:  No hernias present  Lymphatic: Lymph Nodes:  No other lymphadenopathy present  Skin:  General Inspection:  No rashes present, no lesions present, no areas of  discoloration  Neurologic:    Mental Status:  Oriented X3.  Normal strength and tone, sensory exam                grossly normal, mentation intact and speech normal.    Psychiatric:   Mentation appears normal and affect normal/bright.         Pelvic Exam:  External Genitalia:     Normal appearance for age, no discharge present, no tenderness present, no inflammatory lesions present, color normal  Vagina:     Normal vaginal vault without central or paravaginal defects, no discharge present, no inflammatory lesions present, no masses present  Bladder:     Nontender to palpation  Urethra:   Urethral Body:  Urethra palpation normal, urethra structural support normal   Urethral Meatus:  No erythema or lesions present  Cervix:     Appearance healthy, no lesions present, nontender to palpation, no bleeding present  Uterus:     Uterus: firm, normal sized and nontender, anteverted in position.   Adnexa:     No adnexal tenderness present, no adnexal masses present  Perineum:     Perineum within normal limits, no evidence of trauma, no rashes or skin lesions present  Anus:     Anus within normal limits, no hemorrhoids present  Inguinal Lymph Nodes:     No lymphadenopathy present  Pubic Hair:     Normal pubic hair distribution for age  Genitalia and Groin:     No rashes present, no lesions present, no areas of discoloration, no masses present      COUNSELING:   Special attention given to:        Regular exercise       Healthy diet/nutrition       Contraception       Safe sex practices/STD prevention    BMI: Body mass index is 25.43 kg/m .  Weight  management plan: Discussed healthy diet and exercise guidelines    ASSESSMENT:  24 year old female with satisfactory annual exam.    ICD-10-CM    1. Encounter for gynecological examination without abnormal finding Z01.419    2. Dysmenorrhea N94.6 etonogestrel-ethinyl estradiol (NUVARING) 0.12-0.015 MG/24HR vaginal ring   3. Screen for STD (sexually transmitted disease) Z11.3 NEISSERIA GONORRHOEA PCR   4. Screening for chlamydial disease Z11.8 CHLAMYDIA TRACHOMATIS PCR       PLAN:  Completely normal exam today. If pain persists or worsens she will call for US. Continue NR. Will update on STD testing.     TRAN Reyes CNP

## 2019-11-15 ASSESSMENT — ANXIETY QUESTIONNAIRES: GAD7 TOTAL SCORE: 7

## 2020-06-04 DIAGNOSIS — N94.6 DYSMENORRHEA: ICD-10-CM

## 2020-06-04 RX ORDER — ETONOGESTREL AND ETHINYL ESTRADIOL VAGINAL RING .015; .12 MG/D; MG/D
RING VAGINAL
OUTPATIENT
Start: 2020-06-04

## 2020-06-04 NOTE — TELEPHONE ENCOUNTER
"Requested Prescriptions   Pending Prescriptions Disp Refills     etonogestrel-ethinyl estradiol (NUVARING) 0.12-0.015 MG/24HR vaginal ring [Pharmacy Med Name: ETONOGESTERE ETHYNYL EST VAG RING]       Sig: PLACE 1 RING VAGINALLY EVERY 21 DAYS THEN REMOVE FOR 1 WEEK       Contraceptives Protocol Passed - 6/4/2020  3:34 PM        Passed - Patient is not a current smoker if age is 35 or older        Passed - Recent (12 mo) or future (30 days) visit within the authorizing provider's specialty     Patient has had an office visit with the authorizing provider or a provider within the authorizing providers department within the previous 12 mos or has a future within next 30 days. See \"Patient Info\" tab in inbasket, or \"Choose Columns\" in Meds & Orders section of the refill encounter.              Passed - Medication is active on med list        Passed - No active pregnancy on record        Passed - No positive pregnancy test in past 12 months           Refills available  Luciana Oliva RN on 6/4/2020 at 3:37 PM    "

## 2020-06-09 DIAGNOSIS — N94.6 DYSMENORRHEA: ICD-10-CM

## 2020-06-09 RX ORDER — ETONOGESTREL AND ETHINYL ESTRADIOL VAGINAL RING .015; .12 MG/D; MG/D
RING VAGINAL
Qty: 3 EACH | Refills: 1 | Status: SHIPPED | OUTPATIENT
Start: 2020-06-09 | End: 2020-10-15

## 2020-06-09 NOTE — TELEPHONE ENCOUNTER
"Requested Prescriptions   Pending Prescriptions Disp Refills     etonogestrel-ethinyl estradiol (NUVARING) 0.12-0.015 MG/24HR vaginal ring [Pharmacy Med Name: ETONOGESTERE ETHYNYL EST VAG RING]       Sig: PLACE 1 RING EVERY 21 DAYS THEN REMOVE FOR 1 WEEK       Contraceptives Protocol Passed - 6/9/2020  8:34 AM        Passed - Patient is not a current smoker if age is 35 or older        Passed - Recent (12 mo) or future (30 days) visit within the authorizing provider's specialty     Patient has had an office visit with the authorizing provider or a provider within the authorizing providers department within the previous 12 mos or has a future within next 30 days. See \"Patient Info\" tab in inbasket, or \"Choose Columns\" in Meds & Orders section of the refill encounter.              Passed - Medication is active on med list        Passed - No active pregnancy on record        Passed - No positive pregnancy test in past 12 months           Last Written Prescription Date:  11/14/19  Last Fill Quantity: 3,  # refills: 3   Last office visit: 11/14/2019 with prescribing provider:  NIYAH Garrett NP   Future Office Visit:      Refill approved. Pharmacy does not have refills on file.  Mora Kelsey RN on 6/9/2020 at 8:39 AM        "

## 2020-10-15 ENCOUNTER — OFFICE VISIT (OUTPATIENT)
Dept: OBGYN | Facility: CLINIC | Age: 25
End: 2020-10-15
Payer: COMMERCIAL

## 2020-10-15 VITALS
HEIGHT: 65 IN | WEIGHT: 146 LBS | SYSTOLIC BLOOD PRESSURE: 114 MMHG | DIASTOLIC BLOOD PRESSURE: 72 MMHG | BODY MASS INDEX: 24.32 KG/M2

## 2020-10-15 DIAGNOSIS — N94.6 DYSMENORRHEA: ICD-10-CM

## 2020-10-15 DIAGNOSIS — Z01.419 ENCOUNTER FOR GYNECOLOGICAL EXAMINATION WITHOUT ABNORMAL FINDING: Primary | ICD-10-CM

## 2020-10-15 DIAGNOSIS — L65.9 PATCHY LOSS OF HAIR: ICD-10-CM

## 2020-10-15 LAB
BASOPHILS # BLD AUTO: 0 10E9/L (ref 0–0.2)
BASOPHILS NFR BLD AUTO: 0.4 %
DIFFERENTIAL METHOD BLD: ABNORMAL
EOSINOPHIL # BLD AUTO: 0.2 10E9/L (ref 0–0.7)
EOSINOPHIL NFR BLD AUTO: 2.9 %
ERYTHROCYTE [DISTWIDTH] IN BLOOD BY AUTOMATED COUNT: 11.9 % (ref 10–15)
HCT VFR BLD AUTO: 40.7 % (ref 35–47)
HGB BLD-MCNC: 13.7 G/DL (ref 11.7–15.7)
LYMPHOCYTES # BLD AUTO: 2.4 10E9/L (ref 0.8–5.3)
LYMPHOCYTES NFR BLD AUTO: 29.9 %
MCH RBC QN AUTO: 33.9 PG (ref 26.5–33)
MCHC RBC AUTO-ENTMCNC: 33.7 G/DL (ref 31.5–36.5)
MCV RBC AUTO: 101 FL (ref 78–100)
MONOCYTES # BLD AUTO: 0.4 10E9/L (ref 0–1.3)
MONOCYTES NFR BLD AUTO: 5.3 %
NEUTROPHILS # BLD AUTO: 5 10E9/L (ref 1.6–8.3)
NEUTROPHILS NFR BLD AUTO: 61.5 %
PLATELET # BLD AUTO: 301 10E9/L (ref 150–450)
RBC # BLD AUTO: 4.04 10E12/L (ref 3.8–5.2)
VIT B12 SERPL-MCNC: 173 PG/ML (ref 193–986)
WBC # BLD AUTO: 8.2 10E9/L (ref 4–11)

## 2020-10-15 PROCEDURE — G0145 SCR C/V CYTO,THINLAYER,RESCR: HCPCS | Performed by: NURSE PRACTITIONER

## 2020-10-15 PROCEDURE — 82627 DEHYDROEPIANDROSTERONE: CPT | Performed by: NURSE PRACTITIONER

## 2020-10-15 PROCEDURE — 85025 COMPLETE CBC W/AUTO DIFF WBC: CPT | Performed by: NURSE PRACTITIONER

## 2020-10-15 PROCEDURE — 82728 ASSAY OF FERRITIN: CPT | Performed by: NURSE PRACTITIONER

## 2020-10-15 PROCEDURE — 84443 ASSAY THYROID STIM HORMONE: CPT | Performed by: NURSE PRACTITIONER

## 2020-10-15 PROCEDURE — 99395 PREV VISIT EST AGE 18-39: CPT | Performed by: NURSE PRACTITIONER

## 2020-10-15 PROCEDURE — 86376 MICROSOMAL ANTIBODY EACH: CPT | Performed by: NURSE PRACTITIONER

## 2020-10-15 PROCEDURE — 82306 VITAMIN D 25 HYDROXY: CPT | Performed by: NURSE PRACTITIONER

## 2020-10-15 PROCEDURE — 84403 ASSAY OF TOTAL TESTOSTERONE: CPT | Mod: 90 | Performed by: NURSE PRACTITIONER

## 2020-10-15 PROCEDURE — 84270 ASSAY OF SEX HORMONE GLOBUL: CPT | Performed by: NURSE PRACTITIONER

## 2020-10-15 PROCEDURE — 36415 COLL VENOUS BLD VENIPUNCTURE: CPT | Performed by: NURSE PRACTITIONER

## 2020-10-15 PROCEDURE — 86800 THYROGLOBULIN ANTIBODY: CPT | Performed by: NURSE PRACTITIONER

## 2020-10-15 PROCEDURE — 99000 SPECIMEN HANDLING OFFICE-LAB: CPT | Performed by: NURSE PRACTITIONER

## 2020-10-15 PROCEDURE — 84630 ASSAY OF ZINC: CPT | Mod: 90 | Performed by: NURSE PRACTITIONER

## 2020-10-15 PROCEDURE — 82607 VITAMIN B-12: CPT | Performed by: NURSE PRACTITIONER

## 2020-10-15 RX ORDER — ETONOGESTREL AND ETHINYL ESTRADIOL VAGINAL RING .015; .12 MG/D; MG/D
RING VAGINAL
Qty: 3 EACH | Refills: 3 | Status: SHIPPED | OUTPATIENT
Start: 2020-10-15 | End: 2021-04-15

## 2020-10-15 ASSESSMENT — ANXIETY QUESTIONNAIRES
3. WORRYING TOO MUCH ABOUT DIFFERENT THINGS: SEVERAL DAYS
5. BEING SO RESTLESS THAT IT IS HARD TO SIT STILL: SEVERAL DAYS
7. FEELING AFRAID AS IF SOMETHING AWFUL MIGHT HAPPEN: SEVERAL DAYS
IF YOU CHECKED OFF ANY PROBLEMS ON THIS QUESTIONNAIRE, HOW DIFFICULT HAVE THESE PROBLEMS MADE IT FOR YOU TO DO YOUR WORK, TAKE CARE OF THINGS AT HOME, OR GET ALONG WITH OTHER PEOPLE: SOMEWHAT DIFFICULT
GAD7 TOTAL SCORE: 7
6. BECOMING EASILY ANNOYED OR IRRITABLE: SEVERAL DAYS
1. FEELING NERVOUS, ANXIOUS, OR ON EDGE: SEVERAL DAYS
2. NOT BEING ABLE TO STOP OR CONTROL WORRYING: SEVERAL DAYS

## 2020-10-15 ASSESSMENT — PATIENT HEALTH QUESTIONNAIRE - PHQ9
5. POOR APPETITE OR OVEREATING: SEVERAL DAYS
SUM OF ALL RESPONSES TO PHQ QUESTIONS 1-9: 9

## 2020-10-15 ASSESSMENT — MIFFLIN-ST. JEOR: SCORE: 1413.13

## 2020-10-15 NOTE — LETTER
October 21, 2020      Tim Roberto  [address] [address]        Dear ,    We are writing to inform you of your test results.    So far your labs look okay. Your B12 is low, but I don't believe this to be the cause of your hair loss. I would stop your Kratom supplement, as we discussed and make an appointment for a dermatologist for further evaluation.     Resulted Orders   TSH with free T4 reflex   Result Value Ref Range    TSH 2.84 0.40 - 4.00 mU/L   Anti thyroglobulin antibody   Result Value Ref Range    Thyroglobulin Antibody <20 <40 IU/mL   Thyroid peroxidase antibody   Result Value Ref Range    Thyroid Peroxidase Antibody <10 <35 IU/mL   Ferritin   Result Value Ref Range    Ferritin 67 12 - 150 ng/mL   CBC with platelets and differential   Result Value Ref Range    WBC 8.2 4.0 - 11.0 10e9/L    RBC Count 4.04 3.8 - 5.2 10e12/L    Hemoglobin 13.7 11.7 - 15.7 g/dL    Hematocrit 40.7 35.0 - 47.0 %     (H) 78 - 100 fl    MCH 33.9 (H) 26.5 - 33.0 pg    MCHC 33.7 31.5 - 36.5 g/dL    RDW 11.9 10.0 - 15.0 %    Platelet Count 301 150 - 450 10e9/L    % Neutrophils 61.5 %    % Lymphocytes 29.9 %    % Monocytes 5.3 %    % Eosinophils 2.9 %    % Basophils 0.4 %    Absolute Neutrophil 5.0 1.6 - 8.3 10e9/L    Absolute Lymphocytes 2.4 0.8 - 5.3 10e9/L    Absolute Monocytes 0.4 0.0 - 1.3 10e9/L    Absolute Eosinophils 0.2 0.0 - 0.7 10e9/L    Absolute Basophils 0.0 0.0 - 0.2 10e9/L    Diff Method Automated Method    DHEA sulfate   Result Value Ref Range    DHEA Sulfate 293 35 - 430 ug/dL   **Testosterone Free and Total FUTURE anytime   Result Value Ref Range    Testosterone Total 47 8 - 60 ng/dL      Comment:      This test was developed and its performance characteristics determined by the   [facility].   It has not been cleared or approved by the FDA. The laboratory is regulated   under CLIA as qualified to perform  high-complexity testing. This test is used   for clinical purposes. It should not be regarded as investigational or for   research.      Sex Hormone Binding Globulin 95 30 - 135 nmol/L    Free Testosterone Calculated 0.38 0.08 - 0.74 ng/dL      Comment:      18-30 Years 0.08-0.74 ng/dL  31-40 Years 0.13-0.92 ng/dL  41-51 Years 0.11-0.58 ng/dL  Postmenopausal 0.06-0.38 ng/dL     Vitamin D Deficiency   Result Value Ref Range    Vitamin D Deficiency screening 39 20 - 75 ug/L      Comment:      Season, race, dietary intake, and treatment affect the concentration of   25-hydroxy-Vitamin D. Values may decrease during winter months and increase   during summer months. Values 20-29 ug/L may indicate Vitamin D insufficiency   and values <20 ug/L may indicate Vitamin D deficiency.  Vitamin D determination is routinely performed by an immunoassay specific for   25 hydroxyvitamin D3.  If an individual is on vitamin D2 (ergocalciferol)   supplementation, please specify 25 OH vitamin D2 and D3 level determination by   LCMSMS test [id number].     Vitamin B12   Result Value Ref Range    Vitamin B12 173 (L) 193 - 986 pg/mL       If you have any questions or concerns, please call the clinic at the number listed above.       Sincerely,        TRAN Reyes CNP

## 2020-10-15 NOTE — PROGRESS NOTES
Tim is a 24 year old  female who presents for annual exam.     Besides routine health maintenance, she has no other health concerns today .    HPI:  The patient's PCP is [facility].  Pt here today for her annual gyn exam. She is using the NR and really likes the method. Menses are regular, cramping though is pretty terrible.   She has cut back on her smoking and THC intake. She did start taking a supplement called Kratom which she gets from [place]. It's supposed to help with fatigue/moods. She started taking it 1-2 months ago. She has noticed patchy hair loss in the last 2 months as well and it is worsening. She has a large 2.5cm round spot on the back of her left occiput, a quarter size spot on her right parietal bone and left parietal bone. She is visibly distraught over this. She has not changed any diet. She has been under a tremendous amount of stress with several catastrophic events with friends.     Her mom was recently dx with a cluster of autoimmune diseases.      GYNECOLOGIC HISTORY:    Patient's last menstrual period was 2020 (within days).    Regular menses? yes  Menses every 28-30 days.  Length of menses: 6-7 days    Her current contraception method is: Nuvaring.  She  reports that she has been smoking other and cigarettes. She has never used smokeless tobacco.  Tobacco Cessation Action Plan: Self help information given to patient     Patient is sexually active.  STD testing offered?  Declined     Last PHQ-9 score on record =   PHQ-9 SCORE 10/15/2020   PHQ-9 Total Score 9     Last GAD7 score on record =   TOBIAS-7 SCORE 10/15/2020   Total Score 7     Alcohol Score = 9    HEALTH MAINTENANCE:  Cholesterol: None found  Last Mammo: Not applicable, Next Mammo: Due at age 40   Pap:   Lab Results   Component Value Date    PAP NIL 2019    PAP ASC-US 06/10/2016    PAP LSIL 2016      Colonoscopy: N/A, Next Colonoscopy: Due at age 45   Dexa: N/A    Health  "maintenance updated:  yes    HISTORY:  OB History    Para Term  AB Living   1 0 0 0 1 0   SAB TAB Ectopic Multiple Live Births   0 1 0 0 0      # Outcome Date GA Lbr Andres/2nd Weight Sex Delivery Anes PTL Lv   1 TAB 2016 7w0d    TAB          Patient Active Problem List   Diagnosis     Suicidal ideation     Episodic mood disorder (H)     Cannabis abuse     Counseling for parent-child problem     Past Surgical History:   Procedure Laterality Date     PROBE LACRIMAL DUCT      as a baby      Social History     Tobacco Use     Smoking status: Current Every Day Smoker     Types: Other, Cigarettes     Smokeless tobacco: Never Used     Tobacco comment: 3 cigs/day    Substance Use Topics     Alcohol use: Yes     Frequency: 2-3 times a week     Drinks per session: 1 or 2     Binge frequency: Monthly      Problem (# of Occurrences) Relation (Name,Age of Onset)    Hashimoto's thyroiditis (1) Mother    Hyperlipidemia (1) Father    Lupus (1) Mother    Raynaud syndrome (1) Mother    Rheumatoid Arthritis (1) Mother       Negative family history of: Diabetes, Breast Cancer            Current Outpatient Medications   Medication Sig     etonogestrel-ethinyl estradiol (NUVARING) 0.12-0.015 MG/24HR vaginal ring PLACE 1 RING EVERY 21 DAYS THEN REMOVE FOR 1 WEEK     melatonin 3 MG tablet Take 3 mg by mouth     No current facility-administered medications for this visit.      No Known Allergies    Past medical, surgical, social and family histories were reviewed and updated in EPIC.    ROS:   12 point review of systems negative other than symptoms noted below or in the HPI.  No urinary frequency or dysuria, bladder or kidney problems, Normal menstrual cycles, painful menses    EXAM:  /72   Ht 1.651 m (5' 5\")   Wt 66.2 kg (146 lb)   LMP 2020 (Within Days)   BMI 24.30 kg/m     BMI: Body mass index is 24.3 kg/m .    PHYSICAL EXAM:  Constitutional:   Appearance: Well nourished, well developed, alert, in no acute " distress  Head: She has a large 2.5cm round spot on the back of her left occiput, a quarter size spot on her right parietal bone and left parietal bone.  Neck:  Lymph Nodes:  No lymphadenopathy present    Thyroid:  Gland size normal, nontender, no nodules or masses present  on palpation  Chest:  Respiratory Effort:  Breathing unlabored  Cardiovascular:    Heart: Auscultation:  Regular rate, normal rhythm, no murmurs present  Breasts: Inspection of Breasts:  No lymphadenopathy present., Palpation of Breasts and Axillae:  No masses present on palpation, no breast tenderness., Axillary Lymph Nodes:  No lymphadenopathy present., No nodularity, asymmetry or nipple discharge bilaterally. and inverted left nipple  Gastrointestinal:   Abdominal Examination:  Abdomen nontender to palpation, tone normal without rigidity or guarding, no masses present, umbilicus without lesions   Liver and Spleen:  No hepatomegaly present, liver nontender to palpation    Hernias:  No hernias present  Lymphatic: Lymph Nodes:  No other lymphadenopathy present  Skin:  General Inspection:  No rashes present, no lesions present, no areas of  discoloration  Neurologic:    Mental Status:  Oriented X3.  Normal strength and tone, sensory exam grossly normal, mentation intact and speech normal.    Psychiatric:   Mentation appears normal and affect normal/bright.         Pelvic Exam:  External Genitalia:     Normal appearance for age, no discharge present, no tenderness present, no inflammatory lesions present, color normal-NR IN PLACE  Vagina:     Normal vaginal vault without central or paravaginal defects, no discharge present, no inflammatory lesions present, no masses present  Bladder:     Nontender to palpation  Urethra:   Urethral Body:  Urethra palpation normal, urethra structural support normal   Urethral Meatus:  No erythema or lesions present  Cervix:     Appearance healthy, no lesions present, nontender to palpation, no bleeding  present  Uterus:     Uterus: firm, normal sized and nontender, midplane in position.   Adnexa:     No adnexal tenderness present, no adnexal masses present  Perineum:     Perineum within normal limits, no evidence of trauma, no rashes or skin lesions present  Anus:     Anus within normal limits, no hemorrhoids present  Inguinal Lymph Nodes:     No lymphadenopathy present  Pubic Hair:     Normal pubic hair distribution for age  Genitalia and Groin:     No rashes present, no lesions present, no areas of discoloration, no masses present      COUNSELING:   Special attention given to:        Regular exercise       Healthy diet/nutrition       Contraception       Safe sex practices/STD prevention    BMI: Body mass index is 24.3 kg/m .      ASSESSMENT:  24 year old female with satisfactory annual exam.    ICD-10-CM    1. Encounter for gynecological examination without abnormal finding  Z01.419 Pap imaged thin layer screen only - recommended age 21 - 24 years   2. Dysmenorrhea  N94.6 etonogestrel-ethinyl estradiol (NUVARING) 0.12-0.015 MG/24HR vaginal ring   3. Patchy loss of hair  L65.9 TSH with free T4 reflex     Anti thyroglobulin antibody     Thyroid peroxidase antibody     Ferritin     CBC with platelets and differential     DHEA sulfate     **Testosterone Free and Total FUTURE anytime     Vitamin D Deficiency     Vitamin B12     Zinc       PLAN:  Normal gyn exam. Pap collected. If NIL pap every 3 years. Ok to continue NR. I do not think her hair loss is hormone related. We will do a blood panel, she has an appointment with a PCP in a few weeks. We also recommended dermatology eval as well. Recommended seh stop using the Kratom as I am unfamiliar with the supplement, start biotin.     TRAN Reyes CNP

## 2020-10-15 NOTE — LETTER
October 22, 2020      Tim Roberto  [address] [address]        Dear Ms.Felisa,    We are happy to inform you that your recent Pap smear is normal.    It is recommended that you have your next Pap smear in 3 years. You will also need to return to the clinic every year for an annual wellness visit.    If you have additional questions regarding this result, please contact our office and we will be happy to assist you.      Sincerely,    Your [facility] Care Team

## 2020-10-16 LAB
DEPRECATED CALCIDIOL+CALCIFEROL SERPL-MC: 39 UG/L (ref 20–75)
DHEA-S SERPL-MCNC: 293 UG/DL (ref 35–430)
FERRITIN SERPL-MCNC: 67 NG/ML (ref 12–150)
THYROGLOB AB SERPL IA-ACNC: <20 IU/ML (ref 0–40)
THYROPEROXIDASE AB SERPL-ACNC: <10 IU/ML
TSH SERPL DL<=0.005 MIU/L-ACNC: 2.84 MU/L (ref 0.4–4)

## 2020-10-16 ASSESSMENT — ANXIETY QUESTIONNAIRES: GAD7 TOTAL SCORE: 7

## 2020-10-17 LAB
SHBG SERPL-SCNC: 95 NMOL/L (ref 30–135)
TESTOST FREE SERPL-MCNC: 0.38 NG/DL (ref 0.08–0.74)
TESTOST SERPL-MCNC: 47 NG/DL (ref 8–60)

## 2020-10-19 LAB
COPATH REPORT: NORMAL
PAP: NORMAL

## 2020-10-21 LAB — ZINC SERPL-MCNC: 86.8 UG/DL (ref 60–120)

## 2021-02-23 DIAGNOSIS — N94.6 DYSMENORRHEA: ICD-10-CM

## 2021-02-23 RX ORDER — ETONOGESTREL AND ETHINYL ESTRADIOL VAGINAL RING .015; .12 MG/D; MG/D
RING VAGINAL
OUTPATIENT
Start: 2021-02-23

## 2021-02-23 NOTE — TELEPHONE ENCOUNTER
"Requested Prescriptions   Pending Prescriptions Disp Refills     etonogestrel-ethinyl estradiol (NUVARING) 0.12-0.015 MG/24HR vaginal ring [Pharmacy Med Name: ETONOGESTERE ETHYNYL EST VAG RING]       Sig: PLACE 1 RING EVERY 21 DAYS THEN REMOVE FOR 1 WEEK       Contraceptives Protocol Passed - 2/23/2021  3:33 AM        Passed - Patient is not a current smoker if age is 35 or older        Passed - Recent (12 mo) or future (30 days) visit within the authorizing provider's specialty     Patient has had an office visit with the authorizing provider or a provider within the authorizing providers department within the previous 12 mos or has a future within next 30 days. See \"Patient Info\" tab in inbasket, or \"Choose Columns\" in Meds & Orders section of the refill encounter.              Passed - Medication is active on med list        Passed - No active pregnancy on record        Passed - No positive pregnancy test in past 12 months           Last Written Prescription Date:  10/15/20  Last Fill Quantity: 3,  # refills: 3   Last office visit: 10/15/2020 with prescribing provider:  NIYAH Garrett NP   Future Office Visit:      Refill refused as requested too soon. Should have refills available.    Mora Kelsey RN on 2/23/2021 at 7:52 AM            "

## 2021-04-13 DIAGNOSIS — N94.6 DYSMENORRHEA: ICD-10-CM

## 2021-04-14 NOTE — TELEPHONE ENCOUNTER
"Requested Prescriptions   Pending Prescriptions Disp Refills     etonogestrel-ethinyl estradiol (NUVARING) 0.12-0.015 MG/24HR vaginal ring [Pharmacy Med Name: ETONOGESTERE ETHYNYL EST VAG RING]       Sig: INSERT 1 RING VAGINALLY FOR 3 WEEKS THEN REMOVE FOR 1 WEEK       Contraceptives Protocol Passed - 4/13/2021  7:00 PM        Passed - Patient is not a current smoker if age is 35 or older        Passed - Recent (12 mo) or future (30 days) visit within the authorizing provider's specialty     Patient has had an office visit with the authorizing provider or a provider within the authorizing providers department within the previous 12 mos or has a future within next 30 days. See \"Patient Info\" tab in inbasket, or \"Choose Columns\" in Meds & Orders section of the refill encounter.              Passed - Medication is active on med list        Passed - No active pregnancy on record        Passed - No positive pregnancy test in past 12 months           Last Written Prescription Date:  10/15/20  Last Fill Quantity: 3,  # refills: 3   Last office visit: 10/15/2020 with prescribing provider:  Mora Garrett   Future Office Visit:  none          "

## 2021-04-15 RX ORDER — ETONOGESTREL AND ETHINYL ESTRADIOL VAGINAL RING .015; .12 MG/D; MG/D
RING VAGINAL
Qty: 3 EACH | Refills: 2 | Status: SHIPPED | OUTPATIENT
Start: 2021-04-15 | End: 2021-09-21

## 2021-04-15 NOTE — TELEPHONE ENCOUNTER
Resent w message that 3 disp is a 90 day supply based on instructions.  Mora Kelsey RN on 4/15/2021 at 7:28 AM

## 2021-05-01 DIAGNOSIS — N94.6 DYSMENORRHEA: ICD-10-CM

## 2021-05-03 RX ORDER — ETONOGESTREL AND ETHINYL ESTRADIOL VAGINAL RING .015; .12 MG/D; MG/D
RING VAGINAL
OUTPATIENT
Start: 2021-05-03

## 2021-05-03 NOTE — TELEPHONE ENCOUNTER
"Requested Prescriptions   Pending Prescriptions Disp Refills     etonogestrel-ethinyl estradiol (NUVARING) 0.12-0.015 MG/24HR vaginal ring [Pharmacy Med Name: ETONOGESTERE ETHYNYL EST VAG RING]       Sig: INSERT 1 RING VAGINALLY FOR 3 WEEKS THEN REMOVE FOR 1 WEEK       Contraceptives Protocol Passed - 5/1/2021  3:33 AM        Passed - Patient is not a current smoker if age is 35 or older        Passed - Recent (12 mo) or future (30 days) visit within the authorizing provider's specialty     Patient has had an office visit with the authorizing provider or a provider within the authorizing providers department within the previous 12 mos or has a future within next 30 days. See \"Patient Info\" tab in inbasket, or \"Choose Columns\" in Meds & Orders section of the refill encounter.              Passed - Medication is active on med list        Passed - No active pregnancy on record        Passed - No positive pregnancy test in past 12 months           Last Written Prescription Date:  4/15/21  Last Fill Quantity: 3,  # refills: 2   Last office visit: 10/15/2020 with prescribing provider:  NIYAH Garrett NP   Future Office Visit:      Refill request refused due to :   [x] Refills available at pharmacy.  Request sent back to pharmacy as \"duplicate\"  [] Patient report no longer needing it  [] Medication discontinued           Mora Kelsey RN on 5/3/2021 at 8:00 AM        "

## 2021-06-22 ENCOUNTER — TELEPHONE (OUTPATIENT)
Dept: OBGYN | Facility: CLINIC | Age: 26
End: 2021-06-22

## 2021-06-22 DIAGNOSIS — B00.9 HSV-1 INFECTION: ICD-10-CM

## 2021-06-22 RX ORDER — ACYCLOVIR 400 MG/1
400 TABLET ORAL 2 TIMES DAILY
Qty: 180 TABLET | Refills: 0 | Status: SHIPPED | OUTPATIENT
Start: 2021-06-22

## 2021-06-22 NOTE — TELEPHONE ENCOUNTER
Pt called and is out of town and is getting a huge cold sore and has a date tonight and wants to know if she could please get an rx sent for her VALACYCLOVIR . Pt really wants it today. Did let her know Mora MONTES is gone for the day and that might not happen.

## 2021-06-22 NOTE — TELEPHONE ENCOUNTER
Pt hx of HSV-1  Pt out of town, is having a new cold sore on mouth and asking for refill.  Urgently asking if she can have today.    Last annual with NIYAH Garrett 10/2020  Acyclovir on historical med list    Ok to refill?  Rx in queue    Routing to on-call provider.    Suzanne Hampton RN on 6/22/2021 at 4:39 PM

## 2021-07-01 ENCOUNTER — NURSE TRIAGE (OUTPATIENT)
Dept: NURSING | Facility: CLINIC | Age: 26
End: 2021-07-01

## 2021-07-01 NOTE — TELEPHONE ENCOUNTER
Believes that someone in family may or may not have had a stroke     Refuses to give name     Slow movements  Confusion   Severe headache    Still having symptoms several days later    Advised to either have family member call in for triage or to bring them to ED.    Reason for Disposition    Headache  (and neurologic deficit)    Protocols used: NEUROLOGIC DEFICIT-A-AH    Jacqueline Lou RN on 7/1/2021 at 7:21 AM

## 2021-09-10 ENCOUNTER — TELEPHONE (OUTPATIENT)
Dept: OBGYN | Facility: CLINIC | Age: 26
End: 2021-09-10

## 2021-09-10 NOTE — TELEPHONE ENCOUNTER
Patient is calling today with symptoms of a UTI. She is asking if Mora would call her In some medication today. She has an Annual scheduled for 9/21. Please call patient back today on her partner's phone which is [phone]

## 2021-09-10 NOTE — TELEPHONE ENCOUNTER
Called pt and recommended an evaluation in urgent care or can try [email or web address] for assessment.  Mora Garrett NP is out of the office today and does not return until next Tuesday.    Pt verbalized understanding, in agreement with plan, and voiced no further questions.  Mora Kelsey RN on 9/10/2021 at 2:55 PM

## 2021-09-20 NOTE — PROGRESS NOTES
Tim is a 25 year old  female who presents for annual exam.     Besides routine health maintenance, she has no other health concerns today .    HPI:  The patient's PCP is [facility].  Patient here today for her annual GYN exam and STD testing.  She has had a new partner since we last saw her.  She states that she is also has symptoms of a bladder infection for 2 weeks and UA is pending at this time.  She missed her menses last month despite NuvaRing compliance and is requesting a UPT today.  She had a normal Pap smear last year but in light of the new partners we will screen today as well.    She is stressed with a change in living situation and life stressors with a job and finances.  She has quit smoking.      GYNECOLOGIC HISTORY:    No LMP recorded. (Menstrual status: Birth Control).    Regular menses? no  Menses every 20-25 days.  Length of menses: 6 days    Her current contraception method is: NUvaring.  She  reports that she quit smoking about 2 months ago. Her smoking use included other and cigarettes. She has never used smokeless tobacco.    Patient is sexually active.  STD testing offered?  Accepted  Last PHQ-9 score on record =   PHQ-9 SCORE 10/15/2020   PHQ-9 Total Score 9     Last GAD7 score on record =   TOBIAS-7 SCORE 10/15/2020   Total Score 7     Alcohol Score = 2    HEALTH MAINTENANCE:  Cholesterol: (No results found for: CHOL  Last Mammo: Not applicable, Result: Not applicable, Next Mammo: Due at age 40  Pap:   Lab Results   Component Value Date    PAP NIL 10/15/2020    PAP NIL 2019    PAP ASC-US 06/10/2016     Colonoscopy:  neverResult: Not applicable, Next Colonoscopy: due at age[age]  years.  Dexa: never    Health maintenance updated:  yes    HISTORY:  OB History    Para Term  AB Living   1 0 0 0 1 0   SAB TAB Ectopic Multiple Live Births   0 1 0 0 0      # Outcome Date GA Lbr Andres/2nd Weight Sex Delivery Anes PTL Lv   1 TAB 2016 7w0d    TAB    "       Patient Active Problem List   Diagnosis     Suicidal ideation     Episodic mood disorder (H)     Cannabis abuse     Counseling for parent-child problem     Past Surgical History:   Procedure Laterality Date     PROBE LACRIMAL DUCT      as a baby      Social History     Tobacco Use     Smoking status: Former Smoker     Types: Other, Cigarettes     Quit date: 2021     Years since quittin.2     Smokeless tobacco: Never Used     Tobacco comment: 3 cigs/day    Substance Use Topics     Alcohol use: Yes     Alcohol/week: 2.0 standard drinks     Types: 2 Glasses of wine per week      Problem (# of Occurrences) Relation (Name,Age of Onset)    Hashimoto's thyroiditis (1) Mother    Hyperlipidemia (1) Father    Lupus (1) Mother    Raynaud syndrome (1) Mother    Rheumatoid Arthritis (1) Mother       Negative family history of: Diabetes, Breast Cancer            Current Outpatient Medications   Medication Sig     etonogestrel-ethinyl estradiol (NUVARING) 0.12-0.015 MG/24HR vaginal ring INSERT 1 RING VAGINALLY FOR 3 WEEKS THEN REMOVE FOR 1 WEEK     fluconazole (DIFLUCAN) 150 MG tablet Take 1 tablet (150 mg) by mouth once for 1 dose     nitroFURantoin macrocrystal-monohydrate (MACROBID) 100 MG capsule Take 1 capsule (100 mg) by mouth 2 times daily     acyclovir (ZOVIRAX) 400 MG tablet Take 1 tablet (400 mg) by mouth 2 times daily (Patient not taking: Reported on 2021)     melatonin 3 MG tablet Take 3 mg by mouth (Patient not taking: Reported on 2021)     No current facility-administered medications for this visit.     No Known Allergies    Past medical, surgical, social and family histories were reviewed and updated in EPIC.    ROS:   12 point review of systems negative other than symptoms noted below or in the HPI.  Genitourinary: Frequency and Urgency  No urinary frequency or dysuria, bladder or kidney problems, urgency    EXAM:  /66   Pulse 74   Ht 1.651 m (5' 5\")   Wt 64.9 kg (143 lb)   BMI " 23.80 kg/m     BMI: Body mass index is 23.8 kg/m .    PHYSICAL EXAM:  Constitutional:   Appearance: Well nourished, well developed, alert, in no acute distress  Neck:  Lymph Nodes:  No lymphadenopathy present    Thyroid:  Gland size normal, nontender, no nodules or masses present  on palpation  Chest:  Respiratory Effort:  Breathing unlabored  Cardiovascular:    Heart: Auscultation:  Regular rate, normal rhythm, no murmurs present  Breasts: Inspection of Breasts:  No lymphadenopathy present., Palpation of Breasts and Axillae:  No masses present on palpation, no breast tenderness., Axillary Lymph Nodes:  No lymphadenopathy present. and No nodularity, asymmetry or nipple discharge bilaterally.  Gastrointestinal:   Abdominal Examination:  Abdomen nontender to palpation, tone normal without rigidity or guarding, no masses present, umbilicus without lesions   Liver and Spleen:  No hepatomegaly present, liver nontender to palpation    Hernias:  No hernias present  Lymphatic: Lymph Nodes:  No other lymphadenopathy present  Skin:  General Inspection: Solid somewhat bruised area on her left flank.  1 cm nonmobile tender nodule.  Neurologic:    Mental Status:  Oriented X3.  Normal strength and tone, sensory exam                grossly normal, mentation intact and speech normal.    Psychiatric:   Mentation appears normal and affect normal/bright.         Pelvic Exam:  External Genitalia:     Normal appearance for age, no discharge present, no tenderness present, no inflammatory lesions present, color normal erythematous  Vagina:     Normal vaginal vault without central or paravaginal defects, no discharge present, no inflammatory lesions present, no masses present  Bladder:     tender to palpation  Urethra:   Urethral Body:  Urethra palpation normal, urethra structural support normal tender   Urethral Meatus:  No erythema or lesions present  Cervix:     Appearance healthy, no lesions present, nontender to palpation, no  bleeding present  Uterus:     Uterus: firm, normal sized and nontender, anteverted in position.   Adnexa:     No adnexal tenderness present, no adnexal masses present  Perineum:     Perineum within normal limits, no evidence of trauma, no rashes or skin lesions present  Anus:     Anus within normal limits, no hemorrhoids present  Inguinal Lymph Nodes:     No lymphadenopathy present  Pubic Hair:     Normal pubic hair distribution for age  Genitalia and Groin:     No rashes present, no lesions present, no areas of discoloration, no masses present      COUNSELING:   Special attention given to:        Regular exercise       Healthy diet/nutrition       Contraception    BMI: Body mass index is 23.8 kg/m .      ASSESSMENT:  25 year old female with satisfactory annual exam.    ICD-10-CM    1. Encounter for gynecological examination without abnormal finding  Z01.419    2. Dysmenorrhea  N94.6 etonogestrel-ethinyl estradiol (NUVARING) 0.12-0.015 MG/24HR vaginal ring     DISCONTINUED: etonogestrel-ethinyl estradiol (NUVARING) 0.12-0.015 MG/24HR vaginal ring   3. Dysuria  R30.0 UA without Microscopic - lab collect     UA without Microscopic - lab collect     nitroFURantoin macrocrystal-monohydrate (MACROBID) 100 MG capsule     Urine Culture Aerobic Bacterial - lab collect     Urine Culture Aerobic Bacterial - lab collect     CANCELED: UA with Microscopic reflex to Culture - Clinic Collect   4. Screen for STD (sexually transmitted disease)  Z11.3 NEISSERIA GONORRHOEA PCR     Herpes Simplex Virus 1 and 2 IgG     Herpes Simplex Virus 1 and 2 IgG     NEISSERIA GONORRHOEA PCR   5. Screening for chlamydial disease  Z11.8 CHLAMYDIA TRACHOMATIS PCR     CHLAMYDIA TRACHOMATIS PCR   6. Pre-procedure lab exam  Z01.812 HCG Qual, Urine ([id number])     HCG Qual, Urine ([id number])   7. Encounter for screening for cervical cancer   Z12.4 Pap screen reflex to HPV if ASCUS - recommend age 25 - 29   8. Pelvic pain in female  R10.2 Yeast culture      Group B strep PCR     Bacterial Vaginosis Smear     fluconazole (DIFLUCAN) 150 MG tablet     Yeast culture     Bacterial Vaginosis Smear     Group B strep PCR       PLAN:  25-year-old female with a possible UTI and yeast infection.  We will treat her for both and full STD testing as well as vaginal cultures will be sent with a urine culture.  Her Pap smear was updated.  UPT is negative today.  Her urine has some blood and bacteria on it and we will send for culture.  She is okay to continue with NuvaRing x1 year.  We gave her the recommendation of a general surgeon to look at a solid mass on her left flank area.    TRAN Reyes CNP

## 2021-09-21 ENCOUNTER — OFFICE VISIT (OUTPATIENT)
Dept: OBGYN | Facility: CLINIC | Age: 26
End: 2021-09-21
Payer: COMMERCIAL

## 2021-09-21 VITALS
HEART RATE: 74 BPM | SYSTOLIC BLOOD PRESSURE: 110 MMHG | BODY MASS INDEX: 23.82 KG/M2 | HEIGHT: 65 IN | DIASTOLIC BLOOD PRESSURE: 66 MMHG | WEIGHT: 143 LBS

## 2021-09-21 DIAGNOSIS — N94.6 DYSMENORRHEA: ICD-10-CM

## 2021-09-21 DIAGNOSIS — Z12.4 ENCOUNTER FOR SCREENING FOR CERVICAL CANCER: ICD-10-CM

## 2021-09-21 DIAGNOSIS — Z01.419 ENCOUNTER FOR GYNECOLOGICAL EXAMINATION WITHOUT ABNORMAL FINDING: Primary | ICD-10-CM

## 2021-09-21 DIAGNOSIS — Z01.812 PRE-PROCEDURE LAB EXAM: ICD-10-CM

## 2021-09-21 DIAGNOSIS — R30.0 DYSURIA: ICD-10-CM

## 2021-09-21 DIAGNOSIS — Z11.8 SCREENING FOR CHLAMYDIAL DISEASE: ICD-10-CM

## 2021-09-21 DIAGNOSIS — Z11.3 SCREEN FOR STD (SEXUALLY TRANSMITTED DISEASE): ICD-10-CM

## 2021-09-21 DIAGNOSIS — R10.2 PELVIC PAIN IN FEMALE: ICD-10-CM

## 2021-09-21 LAB
ALBUMIN UR-MCNC: 30 MG/DL
APPEARANCE UR: CLEAR
BACTERIAL VAGINOSIS SMEAR: NORMAL
BILIRUB UR QL STRIP: ABNORMAL
COLOR UR AUTO: YELLOW
GLUCOSE UR STRIP-MCNC: NEGATIVE MG/DL
HCG UR QL: NEGATIVE
HGB UR QL STRIP: ABNORMAL
KETONES UR STRIP-MCNC: NEGATIVE MG/DL
LEUKOCYTE ESTERASE UR QL STRIP: ABNORMAL
NITRATE UR QL: NEGATIVE
NUGENT SCORE: 0
PH UR STRIP: 6.5 [PH] (ref 5–7)
SP GR UR STRIP: 1.02 (ref 1–1.03)
UROBILINOGEN UR STRIP-ACNC: 0.2 E.U./DL
WHITE BLOOD CELLS: NORMAL

## 2021-09-21 PROCEDURE — 87591 N.GONORRHOEAE DNA AMP PROB: CPT | Performed by: NURSE PRACTITIONER

## 2021-09-21 PROCEDURE — 87491 CHLMYD TRACH DNA AMP PROBE: CPT | Performed by: NURSE PRACTITIONER

## 2021-09-21 PROCEDURE — 81025 URINE PREGNANCY TEST: CPT | Performed by: NURSE PRACTITIONER

## 2021-09-21 PROCEDURE — 87205 SMEAR GRAM STAIN: CPT | Performed by: NURSE PRACTITIONER

## 2021-09-21 PROCEDURE — 87102 FUNGUS ISOLATION CULTURE: CPT | Performed by: NURSE PRACTITIONER

## 2021-09-21 PROCEDURE — 87624 HPV HI-RISK TYP POOLED RSLT: CPT | Performed by: NURSE PRACTITIONER

## 2021-09-21 PROCEDURE — G0124 SCREEN C/V THIN LAYER BY MD: HCPCS

## 2021-09-21 PROCEDURE — G0145 SCR C/V CYTO,THINLAYER,RESCR: HCPCS | Performed by: NURSE PRACTITIONER

## 2021-09-21 PROCEDURE — 81003 URINALYSIS AUTO W/O SCOPE: CPT | Performed by: NURSE PRACTITIONER

## 2021-09-21 PROCEDURE — 87086 URINE CULTURE/COLONY COUNT: CPT | Performed by: NURSE PRACTITIONER

## 2021-09-21 PROCEDURE — 87653 STREP B DNA AMP PROBE: CPT | Performed by: NURSE PRACTITIONER

## 2021-09-21 PROCEDURE — 99213 OFFICE O/P EST LOW 20 MIN: CPT | Mod: 25 | Performed by: NURSE PRACTITIONER

## 2021-09-21 PROCEDURE — 99395 PREV VISIT EST AGE 18-39: CPT | Performed by: NURSE PRACTITIONER

## 2021-09-21 PROCEDURE — 87106 FUNGI IDENTIFICATION YEAST: CPT | Performed by: NURSE PRACTITIONER

## 2021-09-21 PROCEDURE — 36415 COLL VENOUS BLD VENIPUNCTURE: CPT | Performed by: NURSE PRACTITIONER

## 2021-09-21 PROCEDURE — 86695 HERPES SIMPLEX TYPE 1 TEST: CPT | Performed by: NURSE PRACTITIONER

## 2021-09-21 PROCEDURE — 86696 HERPES SIMPLEX TYPE 2 TEST: CPT | Performed by: NURSE PRACTITIONER

## 2021-09-21 RX ORDER — ETONOGESTREL AND ETHINYL ESTRADIOL VAGINAL RING .015; .12 MG/D; MG/D
RING VAGINAL
Qty: 3 EACH | Refills: 2 | Status: SHIPPED | OUTPATIENT
Start: 2021-09-21

## 2021-09-21 RX ORDER — ETONOGESTREL AND ETHINYL ESTRADIOL VAGINAL RING .015; .12 MG/D; MG/D
RING VAGINAL
Qty: 3 EACH | Refills: 2 | Status: SHIPPED | OUTPATIENT
Start: 2021-09-21 | End: 2021-09-21

## 2021-09-21 RX ORDER — NITROFURANTOIN 25; 75 MG/1; MG/1
100 CAPSULE ORAL 2 TIMES DAILY
Qty: 14 CAPSULE | Refills: 0 | Status: SHIPPED | OUTPATIENT
Start: 2021-09-21

## 2021-09-21 RX ORDER — FLUCONAZOLE 150 MG/1
150 TABLET ORAL ONCE
Qty: 1 TABLET | Refills: 0 | Status: SHIPPED | OUTPATIENT
Start: 2021-09-21 | End: 2021-09-21

## 2021-09-21 ASSESSMENT — MIFFLIN-ST. JEOR: SCORE: 1394.52

## 2021-09-22 LAB
BACTERIA UR CULT: NORMAL
C TRACH DNA SPEC QL NAA+PROBE: NEGATIVE
GP B STREP DNA SPEC QL NAA+PROBE: NEGATIVE
HSV1 IGG SERPL QL IA: 34.8 INDEX
HSV1 IGG SERPL QL IA: ABNORMAL
HSV2 IGG SERPL QL IA: 1.08 INDEX
HSV2 IGG SERPL QL IA: ABNORMAL
N GONORRHOEA DNA SPEC QL NAA+PROBE: NEGATIVE
PATIENT PENICILLIN, AMOXICILLIN, CEPHALOSPORINS ALLERGY: NO

## 2021-09-24 LAB — BACTERIA SPEC CULT: ABNORMAL

## 2021-09-27 LAB
BKR LAB AP GYN ADEQUACY: ABNORMAL
BKR LAB AP GYN INTERPRETATION: ABNORMAL
BKR LAB AP HPV REFLEX: ABNORMAL
BKR LAB AP PREVIOUS ABNORMAL: ABNORMAL
PATH REPORT.ADDENDUM SPEC: ABNORMAL
PATH REPORT.COMMENTS IMP SPEC: ABNORMAL
PATH REPORT.RELEVANT HX SPEC: ABNORMAL

## 2021-09-28 LAB
HUMAN PAPILLOMA VIRUS 16 DNA: NEGATIVE
HUMAN PAPILLOMA VIRUS 18 DNA: NEGATIVE
HUMAN PAPILLOMA VIRUS FINAL DIAGNOSIS: NORMAL
HUMAN PAPILLOMA VIRUS OTHER HR: NEGATIVE

## 2021-09-29 ENCOUNTER — PATIENT OUTREACH (OUTPATIENT)
Dept: OBGYN | Facility: CLINIC | Age: 26
End: 2021-09-29

## 2021-10-20 ENCOUNTER — OFFICE VISIT (OUTPATIENT)
Dept: OBGYN | Facility: CLINIC | Age: 26
End: 2021-10-20
Payer: COMMERCIAL

## 2021-10-20 VITALS
DIASTOLIC BLOOD PRESSURE: 62 MMHG | BODY MASS INDEX: 23.49 KG/M2 | HEIGHT: 65 IN | WEIGHT: 141 LBS | SYSTOLIC BLOOD PRESSURE: 118 MMHG

## 2021-10-20 DIAGNOSIS — R10.2 VAGINAL PAIN: ICD-10-CM

## 2021-10-20 PROCEDURE — 99212 OFFICE O/P EST SF 10 MIN: CPT | Performed by: ADVANCED PRACTICE MIDWIFE

## 2021-10-20 ASSESSMENT — MIFFLIN-ST. JEOR: SCORE: 1385.45

## 2021-10-20 NOTE — NURSING NOTE
"Chief Complaint   Patient presents with     Gyn Exam     Sharp pain with intercourse        Initial /62 (BP Location: Right arm, Patient Position: Sitting, Cuff Size: Adult Regular)   Ht 1.651 m (5' 5\")   Wt 64 kg (141 lb)   BMI 23.46 kg/m   Estimated body mass index is 23.46 kg/m  as calculated from the following:    Height as of this encounter: 1.651 m (5' 5\").    Weight as of this encounter: 64 kg (141 lb).  BP completed using cuff size: regular    Questioned patient about current smoking habits.  Pt. has never smoked.          The following HM Due: NONE    Nadeem Oliveira CMA                "

## 2021-10-20 NOTE — PROGRESS NOTES
"S:  She is here due to pain during intercourse. She uses the contraceptive ring.  She is having some conflict with her partner right now.  He asked her to bring him \"documentation\" that she does not have herpes.  She does not report any safety concerns.  She is wondering if they have a relationship or not.  She was recently see and treated for a vaginal yeast infection.  STI testing was negative.  She wonders if the pain could be due to her current feelings.    O:  Appears well, concerned   Pelvic Exam:  Vulva: No external lesions, normal hair distribution, no adenopathy  Vagina: Moist, pink, no abnormal discharge, well rugated, no lesions  Ring present in vagina - no pain during exam   Cervix: smooth, pink, no visible lesions  Uterus: Normal size, anteverted, non-tender, mobile  Ovaries: No mass, non-tender, mobile  A:  Vaginal pain   P:  She has no pain during exam today.  No lesions found.  We discussed that a blood herpes test does not mean that she has or does not have genital herpes.  She has never has a known outbreak or a culture done.    Discussed that our feelings can affect our bodies.  Support given to verbalize her concerns with her partner.    She verbalized understanding and relief.    Return to clinic  As needed.    "

## 2022-09-07 ENCOUNTER — PATIENT OUTREACH (OUTPATIENT)
Dept: OBGYN | Facility: CLINIC | Age: 27
End: 2022-09-07

## 2022-09-07 DIAGNOSIS — R87.612 LGSIL ON PAP SMEAR OF CERVIX: ICD-10-CM

## 2022-09-07 NOTE — LETTER
September 7, 2022      Tim Roberto  [address] [address]        Dear ,    This letter is to remind you that you are due for your follow-up Pap smear and Human Papillomavirus (HPV) test.    Please call [phone] to schedule your appointment at your earliest convenience.    If you have completed the appointment outside of the [facility] system, please have the records forwarded to our office. We will update your chart for your provider to review before your next annual wellness visit.     Thank you for choosing [facility]!      Sincerely,    Your [facility] Care Team  
yes

## 2022-11-08 NOTE — TELEPHONE ENCOUNTER
FYI to provider - Patient is lost to pap tracking follow-up. Attempts to contact pt have been made per reminder process and there has been no reply and/or no appt scheduled. Contact hx listed below.     5/25/16 LSIL >>6/10/16 Colpo- CIN1. ASCUS/+ HR HPV, age 20  1/29/19 NIL pap.  Pap in 3 years  10/15/20 NIL pap. Pap in 3 years  9/21/21 ASCUS, Neg HPV. Cotest in 1 year due 09/21/22. Letter sent.   09/07/22 Reminder letter  10/04/22 Reminder call - left message  11/08/22 Lost to follow-up for pap tracking

## 2023-05-19 ENCOUNTER — OFFICE VISIT (OUTPATIENT)
Dept: URGENT CARE | Facility: URGENT CARE | Age: 28
End: 2023-05-19

## 2023-05-19 VITALS
TEMPERATURE: 98 F | DIASTOLIC BLOOD PRESSURE: 68 MMHG | OXYGEN SATURATION: 99 % | HEART RATE: 78 BPM | SYSTOLIC BLOOD PRESSURE: 118 MMHG

## 2023-05-19 DIAGNOSIS — L72.3 INFECTED SEBACEOUS CYST: Primary | ICD-10-CM

## 2023-05-19 DIAGNOSIS — L08.9 INFECTED SEBACEOUS CYST: Primary | ICD-10-CM

## 2023-05-19 PROCEDURE — 99203 OFFICE O/P NEW LOW 30 MIN: CPT | Mod: 25 | Performed by: FAMILY MEDICINE

## 2023-05-19 PROCEDURE — 87070 CULTURE OTHR SPECIMN AEROBIC: CPT | Performed by: FAMILY MEDICINE

## 2023-05-19 PROCEDURE — 10060 I&D ABSCESS SIMPLE/SINGLE: CPT | Performed by: FAMILY MEDICINE

## 2023-05-19 RX ORDER — SULFAMETHOXAZOLE/TRIMETHOPRIM 800-160 MG
1 TABLET ORAL 2 TIMES DAILY
Qty: 20 TABLET | Refills: 0 | Status: SHIPPED | OUTPATIENT
Start: 2023-05-19 | End: 2023-05-29

## 2023-05-19 NOTE — PROGRESS NOTES
SUBJECTIVE:  Chief Complaint   Patient presents with     Urgent Care     Lump on left side of back x 1 yr-  started hurting 4 days ago     Tim Roberto is a 27 year old female who presents with a chief complaint of lump on left side of back.    Developed cyst on left back about 1 year ago, no problem until 4 days ago that started to developed more redness and pain.  This has gotten worse, cyst got bigger and harder.  No fever.  Tried ice pack and took ibuprofen.    Was on antibiotic about a month ago for dental infection.     Past Medical History:   Diagnosis Date     ADHD (attention deficit hyperactivity disorder)     some reports that may be selling these meds     Deliberate self-cutting      Depression      HSV-1 infection      LGSIL on Pap smear of cervix 2019     Panic attacks      Current Outpatient Medications   Medication Sig Dispense Refill     etonogestrel (NEXPLANON) 68 MG IMPL 1 each by Subdermal route once       acyclovir (ZOVIRAX) 400 MG tablet Take 1 tablet (400 mg) by mouth 2 times daily (Patient not taking: Reported on 10/20/2021) 180 tablet 0     etonogestrel-ethinyl estradiol (NUVARING) 0.12-0.015 MG/24HR vaginal ring INSERT 1 RING VAGINALLY FOR 3 WEEKS THEN REMOVE FOR 1 WEEK (Patient not taking: Reported on 2023) 3 each 2     melatonin 3 MG tablet Take 3 mg by mouth  (Patient not taking: Reported on 2023)       nitroFURantoin macrocrystal-monohydrate (MACROBID) 100 MG capsule Take 1 capsule (100 mg) by mouth 2 times daily (Patient not taking: Reported on 2023) 14 capsule 0     Social History     Tobacco Use     Smoking status: Former     Types: Other, Cigarettes     Quit date: 2021     Years since quittin.8     Smokeless tobacco: Never     Tobacco comments:     3 cigs/day    Vaping Use     Vaping status: Not on file   Substance Use Topics     Alcohol use: Yes     Alcohol/week: 2.0 standard drinks of alcohol     Types: 2 Glasses of wine per week       ROS:  Review of  systems negative except as stated above.    EXAM:   /68   Pulse 78   Temp 98  F (36.7  C) (Tympanic)   SpO2 99%   GENERAL APPEARANCE: healthy, alert and no distress  SKIN: left back with cyst with small purulent area with redness, warmth, tenderness.  Central area fluctuant, has more indurated tissue surrounding cyst area  PSYCH:alert, affect bright      ASSESSMENT/PLAN:  (L72.3,  L08.9) Infected sebaceous cyst  (primary encounter diagnosis)  Comment: left back  Plan: sulfamethoxazole-trimethoprim (BACTRIM DS)         800-160 MG tablet, Abscess Aerobic Bacterial         Culture Routine, Skin Abscess, Simple [[id number]]            Verbal consent for I&D.    Betadine to area, 2 ml lidocaine 1% with epi anesthestized area, 11 blade incision into cyst with copious purulent, greenish drainage and few cheesy curdish debris, wound culture obtained.  Cyst was pressed until only bloody drainage.  Dressing to wound.    Reviewed that incision will heal by itself, most likely will have some drainage for a few days.  RX Bactrim DS given for treatment of infection.  Will follow up on wound culture and adjust medication if needed.  Encourage tylenol, ibuprofen and warm compress to area.    Follow up with primary provider if no improvement of symptoms in 1 week    Damon Arias MD  May 19, 2023 2:36 PM

## 2023-05-23 LAB — BACTERIA ABSC ANAEROBE+AEROBE CULT: NORMAL
